# Patient Record
Sex: FEMALE | Race: WHITE | NOT HISPANIC OR LATINO | Employment: UNEMPLOYED | URBAN - NONMETROPOLITAN AREA
[De-identification: names, ages, dates, MRNs, and addresses within clinical notes are randomized per-mention and may not be internally consistent; named-entity substitution may affect disease eponyms.]

---
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
## 2024-02-20 ENCOUNTER — HOSPITAL ENCOUNTER (EMERGENCY)
Facility: HOSPITAL | Age: 19
Discharge: HOME | End: 2024-02-20
Attending: STUDENT IN AN ORGANIZED HEALTH CARE EDUCATION/TRAINING PROGRAM | Admitting: EMERGENCY MEDICINE
Payer: COMMERCIAL

## 2024-02-20 VITALS
WEIGHT: 123 LBS | RESPIRATION RATE: 16 BRPM | OXYGEN SATURATION: 99 % | DIASTOLIC BLOOD PRESSURE: 60 MMHG | HEART RATE: 80 BPM | TEMPERATURE: 98.4 F | HEIGHT: 64 IN | SYSTOLIC BLOOD PRESSURE: 128 MMHG | BODY MASS INDEX: 21 KG/M2

## 2024-02-20 DIAGNOSIS — S65.211A: Primary | ICD-10-CM

## 2024-02-20 PROCEDURE — 99283 EMERGENCY DEPT VISIT LOW MDM: CPT

## 2024-02-20 PROCEDURE — 90715 TDAP VACCINE 7 YRS/> IM: CPT | Performed by: NURSE PRACTITIONER

## 2024-02-20 PROCEDURE — 90471 IMMUNIZATION ADMIN: CPT | Performed by: NURSE PRACTITIONER

## 2024-02-20 PROCEDURE — 25010000002 TETANUS-DIPHTH-ACELL PERTUSSIS 5-2.5-18.5 LF-MCG/0.5 SUSPENSION PREFILLED SYRINGE: Performed by: NURSE PRACTITIONER

## 2024-02-20 RX ADMIN — TETANUS TOXOID, REDUCED DIPHTHERIA TOXOID AND ACELLULAR PERTUSSIS VACCINE, ADSORBED 0.5 ML: 5; 2.5; 8; 8; 2.5 SUSPENSION INTRAMUSCULAR at 21:23

## 2024-02-21 NOTE — ED PROVIDER NOTES
Subjective   History of Present Illness  Patient is a 18-year-old female with no significant past medical history presenting to the ER complaints of right palm laceration after cutting herself with a knife trying to make potato soup.  Patient denies any additional injuries.  Patient unsure if she is up-to-date on Tdap.  Bleeding is controlled.    History provided by:  Patient   used: No        Review of Systems   Constitutional: Negative.  Negative for fever.   HENT: Negative.     Respiratory: Negative.     Cardiovascular: Negative.  Negative for chest pain.   Gastrointestinal: Negative.  Negative for abdominal pain.   Endocrine: Negative.    Genitourinary: Negative.  Negative for dysuria.   Skin:  Positive for wound.   Neurological: Negative.    Psychiatric/Behavioral: Negative.     All other systems reviewed and are negative.      Past Medical History:   Diagnosis Date    Allergic rhinitis        Allergies   Allergen Reactions    Vancomycin        No past surgical history on file.    No family history on file.    Social History     Socioeconomic History    Marital status: Single   Tobacco Use    Smoking status: Never           Objective   Physical Exam  Vitals and nursing note reviewed.   Constitutional:       General: She is not in acute distress.     Appearance: She is well-developed. She is not diaphoretic.   HENT:      Head: Normocephalic and atraumatic.      Right Ear: External ear normal.      Left Ear: External ear normal.      Nose: Nose normal.   Eyes:      Conjunctiva/sclera: Conjunctivae normal.      Pupils: Pupils are equal, round, and reactive to light.   Neck:      Vascular: No JVD.      Trachea: No tracheal deviation.   Cardiovascular:      Rate and Rhythm: Normal rate and regular rhythm.      Heart sounds: Normal heart sounds. No murmur heard.  Pulmonary:      Effort: Pulmonary effort is normal. No respiratory distress.      Breath sounds: Normal breath sounds. No wheezing.    Abdominal:      General: Bowel sounds are normal.      Palpations: Abdomen is soft.      Tenderness: There is no abdominal tenderness.   Musculoskeletal:         General: No deformity. Normal range of motion.      Cervical back: Normal range of motion and neck supple.   Skin:     General: Skin is warm and dry.      Coloration: Skin is not pale.      Findings: Laceration and wound present. No erythema or rash.      Comments: 0.25 cm laceration to right palm of hand    Neurological:      Mental Status: She is alert and oriented to person, place, and time.      Cranial Nerves: No cranial nerve deficit.   Psychiatric:         Behavior: Behavior normal.         Thought Content: Thought content normal.         Laceration Repair    Date/Time: 2/20/2024 9:30 PM    Performed by: Tatiana Plaza APRN  Authorized by: Killian Townsend DO    Consent:     Consent obtained:  Verbal    Consent given by:  Patient    Risks, benefits, and alternatives were discussed: yes      Risks discussed:  Infection, need for additional repair, nerve damage, vascular damage, tendon damage, retained foreign body, pain, poor cosmetic result and poor wound healing    Alternatives discussed:  No treatment, delayed treatment, observation and referral  Universal protocol:     Procedure explained and questions answered to patient or proxy's satisfaction: yes      Relevant documents present and verified: yes      Test results available: yes      Imaging studies available: yes      Required blood products, implants, devices, and special equipment available: yes      Site/side marked: yes      Immediately prior to procedure, a time out was called: yes      Patient identity confirmed:  Verbally with patient and arm band  Anesthesia:     Anesthesia method:  None  Laceration details:     Location:  Hand    Hand location:  R palm    Length (cm):  0.3  Pre-procedure details:     Preparation:  Patient was prepped and draped in usual sterile  fashion  Exploration:     Limited defect created (wound extended): no      Hemostasis achieved with:  Direct pressure    Imaging outcome: foreign body not noted      Wound exploration: wound explored through full range of motion      Wound extent: no areolar tissue violation noted, no fascia violation noted, no foreign bodies/material noted, no muscle damage noted, no nerve damage noted, no tendon damage noted, no underlying fracture noted and no vascular damage noted      Contaminated: no    Treatment:     Area cleansed with:  Povidone-iodine    Amount of cleaning:  Standard    Debridement:  None    Undermining:  None    Scar revision: no    Skin repair:     Repair method:  Steri-Strips    Number of Steri-Strips:  1  Approximation:     Approximation:  Close  Repair type:     Repair type:  Simple  Post-procedure details:     Dressing:  Open (no dressing)    Procedure completion:  Tolerated well, no immediate complications             ED Course  ED Course as of 02/20/24 2131 Tue Feb 20, 2024 2117 Steri strip right palm for wound closure  [SM]      ED Course User Index  [SM] Tatiana Plaza APRN                                             Medical Decision Making  Patient is a 18-year-old female with no significant past medical history presenting to the ER complaints of right palm laceration after cutting herself with a knife trying to make potato soup.  Patient denies any additional injuries.  Patient unsure if she is up-to-date on Tdap.  Bleeding is controlled.    Advised patient to return to the ER with new or worsening symptoms.  Advised patient to follow-up with PCP.  Patient verbalized understanding and agrees.  Vital signs are stable at discharge.  Patient is in no acute distress.    Problems Addressed:  Laceration of superficial palmar arch of right hand, initial encounter: complicated acute illness or injury    Risk  Prescription drug management.        Final diagnoses:   Laceration of superficial  palmar arch of right hand, initial encounter       ED Disposition  ED Disposition       ED Disposition   Discharge    Condition   Stable    Comment   --               Yasmine, April, APRN  [address] [facility] [address] [phone]    Schedule an appointment as soon as possible for a visit   As needed         Medication List        Stop      cefdinir 300 MG capsule  Commonly known as: Tatiana Arrington, APRN  02/20/24 8309

## 2024-02-25 ENCOUNTER — HOSPITAL ENCOUNTER (EMERGENCY)
Facility: HOSPITAL | Age: 19
Discharge: HOME | End: 2024-02-25
Attending: EMERGENCY MEDICINE | Admitting: EMERGENCY MEDICINE
Payer: COMMERCIAL

## 2024-02-25 VITALS
BODY MASS INDEX: 21 KG/M2 | HEIGHT: 64 IN | WEIGHT: 123 LBS | OXYGEN SATURATION: 98 % | DIASTOLIC BLOOD PRESSURE: 58 MMHG | SYSTOLIC BLOOD PRESSURE: 115 MMHG | TEMPERATURE: 98.5 F | RESPIRATION RATE: 14 BRPM | HEART RATE: 90 BPM

## 2024-02-25 DIAGNOSIS — L23.9 ALLERGIC DERMATITIS: Primary | ICD-10-CM

## 2024-02-25 PROCEDURE — 99282 EMERGENCY DEPT VISIT SF MDM: CPT

## 2024-02-25 RX ORDER — METHYLPREDNISOLONE 4 MG/1
TABLET ORAL
Qty: 21 TABLET | Refills: 0 | Status: SHIPPED | OUTPATIENT
Start: 2024-02-25

## 2024-02-25 RX ORDER — CETIRIZINE HYDROCHLORIDE 10 MG/1
10 TABLET ORAL DAILY
Qty: 15 TABLET | Refills: 0 | Status: SHIPPED | OUTPATIENT
Start: 2024-02-25

## 2024-02-25 NOTE — ED PROVIDER NOTES
Subjective   History of Present Illness  C/o rash all over body that is pruritic, unsure what is causing her to itch.   Pt has allergies    No fever, sorethroat     History provided by:  Patient   used: No    Rash  Location:  Full body  Quality: itchiness and redness    Quality: not blistering, not bruising, not burning, not draining, not dry, not painful, not peeling, not scaling, not swelling and not weeping    Severity:  Mild  Onset quality:  Sudden  Duration:  2 days  Timing:  Constant  Chronicity:  New  Context comment:  Unsure  Relieved by:  Antihistamines  Worsened by:  Nothing  Ineffective treatments:  None tried      Review of Systems   Skin:  Positive for rash.       Past Medical History:   Diagnosis Date    Allergic rhinitis        Allergies   Allergen Reactions    Vancomycin        No past surgical history on file.    No family history on file.    Social History     Socioeconomic History    Marital status: Single   Tobacco Use    Smoking status: Never           Objective   Physical Exam  Vitals and nursing note reviewed.   Constitutional:       Appearance: She is well-developed.   HENT:      Head: Normocephalic.   Cardiovascular:      Rate and Rhythm: Normal rate and regular rhythm.   Pulmonary:      Effort: Pulmonary effort is normal.      Breath sounds: Normal breath sounds.   Abdominal:      General: Bowel sounds are normal.      Palpations: Abdomen is soft.      Tenderness: There is no abdominal tenderness.   Musculoskeletal:         General: Normal range of motion.      Cervical back: Neck supple.   Skin:     General: Skin is warm and dry.      Findings: Erythema and rash present. No petechiae.   Neurological:      Mental Status: She is alert and oriented to person, place, and time.   Psychiatric:         Behavior: Behavior normal.         Thought Content: Thought content normal.         Judgment: Judgment normal.         Procedures           ED Course                                              Medical Decision Making  C/o rash all over body that is pruritic, unsure what is causing her to itch.   Pt has allergies    No fever, sorethroat   Using benadryl with some improvement   No soa, throat swelling     Problems Addressed:  Allergic dermatitis: complicated acute illness or injury    Risk  OTC drugs.  Prescription drug management.  Risk Details: Jessica Sanders    Patient is stable.  Patient is medically cleared.  No EMC exist.  Patient is discharged home.  Patient's diagnostic results were discussed with him and they demonstrated understanding of diagnosis and treatment plan.  Patient was advised to return to the ER or follow-up with PCP if symptoms worsen or fail to improve as expected.         Final diagnoses:   Allergic dermatitis       ED Disposition  ED Disposition       ED Disposition   Discharge    Condition   Stable    Comment   --               Yasmine, April, APRN  [address]  [facility] [address] [phone]    In 2 days           Medication List        New Prescriptions      methylPREDNISolone 4 MG dose pack  Commonly known as: MEDROL  Take as directed on package instructions.               Where to Get Your Medications        These medications were sent to [id number] - [place], [place] - [address] [facility] AT [address] OF [address] [phone] [facility] [phone] FX  [address] [place] [address]      Phone: [phone]   cetirizine 10 MG tablet  methylPREDNISolone 4 MG dose pack            Jessica Sanders PA  02/25/24 8299       Jessica Sanders PA  02/25/24 6116

## 2024-03-05 ENCOUNTER — TRANSCRIBE ORDERS (OUTPATIENT)
Dept: ADMINISTRATIVE | Facility: HOSPITAL | Age: 19
End: 2024-03-05
Payer: COMMERCIAL

## 2024-03-05 DIAGNOSIS — R10.84 GENERALIZED ABDOMINAL PAIN: Primary | ICD-10-CM

## 2024-03-10 ENCOUNTER — HOSPITAL ENCOUNTER (OUTPATIENT)
Dept: ULTRASOUND IMAGING | Facility: HOSPITAL | Age: 19
Discharge: HOME | End: 2024-03-10
Admitting: FAMILY MEDICINE
Payer: COMMERCIAL

## 2024-03-10 DIAGNOSIS — R10.84 GENERALIZED ABDOMINAL PAIN: ICD-10-CM

## 2024-03-10 PROCEDURE — 76700 US EXAM ABDOM COMPLETE: CPT | Performed by: RADIOLOGY

## 2024-03-10 PROCEDURE — 76700 US EXAM ABDOM COMPLETE: CPT

## 2024-07-17 LAB
EXTERNAL ABO GROUPING: NORMAL
EXTERNAL ANTIBODY SCREEN: NEGATIVE
EXTERNAL HEPATITIS B SURFACE ANTIGEN: NEGATIVE
EXTERNAL HEPATITIS C AB: NORMAL
EXTERNAL RH FACTOR: POSITIVE
EXTERNAL RUBELLA QUALITATIVE: NORMAL
HIV1 P24 AG SERPL QL IA: NORMAL

## 2024-09-26 ENCOUNTER — REFERRAL TRIAGE (OUTPATIENT)
Dept: LABOR AND DELIVERY | Facility: HOSPITAL | Age: 19
End: 2024-09-26
Payer: COMMERCIAL

## 2024-10-17 ENCOUNTER — PATIENT OUTREACH (OUTPATIENT)
Dept: LABOR AND DELIVERY | Facility: HOSPITAL | Age: 19
End: 2024-10-17
Payer: COMMERCIAL

## 2024-10-17 ENCOUNTER — PATIENT MESSAGE (OUTPATIENT)
Dept: LABOR AND DELIVERY | Facility: HOSPITAL | Age: 19
End: 2024-10-17
Payer: COMMERCIAL

## 2024-10-17 NOTE — OUTREACH NOTE
[facility]  Intake    Current Estimated Gestational Age: 22w1d    Intake Assessment      Flowsheet Row Responses   Best Method for Contacting Home   Currently Employed No   Able to keep appointments as scheduled Yes   Do you have a dentist? No   Resources Presently Utilizing: HANDS, WIC (Women, Infant, Children)   Maternal Warning Signs Provided   Other: Provided   Other Education WIC Benefits, Mental Health Services, Insurance benefits/Incentives, How to find a primary care provider, How to find a pediatrician, Transportation Assistance, How to find a dentist, Substance Use Disorder Treatment, Housing Assistance, SNAP Benefits, HANDS, Smoking/Vaping Cessation            Learning Assessment      Flowsheet Row Responses   Relationship Patient   Does the learner have any barriers to learning? No Barriers   What is the preferred language of the learner for medical teaching? English   Is an  required? No   How does the learner prefer to learn new concepts? Listening, Reading            Spoke with patient over the phone. Discussed community and insurance extra benefits. Pt states she is enrolled in WIC and HANDS programs and is undecided on feeding plan for infant. SDOH and information packet for  sent by [facility]. Pt denies any urgent needs or concerns at this time.    Referral submitted to the following resources (verbal consent received to submit demographic information):         Tobacco, Alcohol, and Drug History     reports that she has never smoked. She does not have any smokeless tobacco history on file.   has no history on file for alcohol use.   has no history on file for drug use.    Rae Helton RN  Maternity Nurse Navigator    10/17/2024, 14:24 EDT        [facility]  Enrollment    Current Estimated Gestational Age: 22w1d    Questions/Answers      Flowsheet Row Responses   Would like to participate? Yes   Date of Intake Visit 10/17/24                Rae Helton RN  Maternity  Nurse Navigator    10/17/2024, 14:24 EDT

## 2024-11-07 ENCOUNTER — PATIENT OUTREACH (OUTPATIENT)
Dept: LABOR AND DELIVERY | Facility: HOSPITAL | Age: 19
End: 2024-11-07
Payer: COMMERCIAL

## 2024-11-07 NOTE — OUTREACH NOTE
[facility]  Check-In    Current Estimated Gestational Age: 25w1d      Questions/Answers      Flowsheet Row Responses   Best Method for Contacting Home   Demographics Reviewed Yes   Currently Employed No   Able to keep appointments as scheduled Yes   Gender(s) and Name(s) Female   Education related to new diagnoses/home equipment No   May I ask you questions about your substance use? Yes   Other Comment Denies   Supplies ready for baby Clothing   Resource/Environmental Concerns None   Do you have any questions related to your care experience, your pregnancy, plans for delivery, any concerns, etc? No            Spoke with patient over the phone. Pt states her pregnancy is going well. She denies any needs or concerns at this time.    Referral submitted to the following resources (verbal consent received to submit demographic information):         Rae Helton RN  Maternity Nurse Navigator    11/7/2024, 16:18 EST

## 2024-11-20 ENCOUNTER — HOSPITAL ENCOUNTER (EMERGENCY)
Facility: HOSPITAL | Age: 19
Discharge: HOME | End: 2024-11-20
Attending: EMERGENCY MEDICINE | Admitting: EMERGENCY MEDICINE
Payer: COMMERCIAL

## 2024-11-20 VITALS
BODY MASS INDEX: 22.36 KG/M2 | HEIGHT: 64 IN | DIASTOLIC BLOOD PRESSURE: 81 MMHG | SYSTOLIC BLOOD PRESSURE: 125 MMHG | TEMPERATURE: 97.8 F | RESPIRATION RATE: 20 BRPM | WEIGHT: 131 LBS | OXYGEN SATURATION: 97 % | HEART RATE: 104 BPM

## 2024-11-20 DIAGNOSIS — S05.01XA ABRASION OF RIGHT CORNEA, INITIAL ENCOUNTER: Primary | ICD-10-CM

## 2024-11-20 PROCEDURE — 99283 EMERGENCY DEPT VISIT LOW MDM: CPT

## 2024-11-20 RX ORDER — POLYMYXIN B SULFATE AND TRIMETHOPRIM 1; 10000 MG/ML; [USP'U]/ML
1 SOLUTION OPHTHALMIC
Status: DISCONTINUED | OUTPATIENT
Start: 2024-11-20 | End: 2024-11-20 | Stop reason: HOSPADM

## 2024-11-20 RX ORDER — POLYMYXIN B SULFATE AND TRIMETHOPRIM 1; 10000 MG/ML; [USP'U]/ML
1 SOLUTION OPHTHALMIC EVERY 4 HOURS
Qty: 10 ML | Refills: 0 | Status: SHIPPED | OUTPATIENT
Start: 2024-11-20

## 2024-11-20 RX ADMIN — POLYMYXIN B SULFATE AND TRIMETHOPRIM 1 DROP: 10000; 1 SOLUTION OPHTHALMIC at 19:14

## 2024-11-20 NOTE — ED PROVIDER NOTES
Subjective   History of Present Illness  19-year-old female with past medical history of allergic rhinitis presents to the emergency room with possible foreign body in right eye.  Patient states she was lying in bed yesterday and felt something in her eye.  She states since that time she has had trouble with light and holding her eyelid open as states it feels very brittany.  She is able to see without difficulty but at times is blurry.  Denies any other complaints or concerns at this time.    History provided by:  Patient   used: No        Review of Systems   Constitutional: Negative.  Negative for fever.   HENT: Negative.     Eyes:  Positive for redness.   Respiratory: Negative.     Cardiovascular: Negative.  Negative for chest pain.   Gastrointestinal: Negative.  Negative for abdominal pain.   Endocrine: Negative.    Genitourinary: Negative.  Negative for dysuria.   Skin: Negative.    Neurological: Negative.    Psychiatric/Behavioral: Negative.     All other systems reviewed and are negative.      Past Medical History:   Diagnosis Date    Allergic rhinitis        Allergies   Allergen Reactions    Vancomycin        No past surgical history on file.    No family history on file.    Social History     Socioeconomic History    Marital status: Single   Tobacco Use    Smoking status: Never           Objective   Physical Exam  Vitals and nursing note reviewed.   Constitutional:       General: She is not in acute distress.     Appearance: She is well-developed. She is not diaphoretic.   HENT:      Head: Normocephalic and atraumatic.      Right Ear: External ear normal.      Left Ear: External ear normal.      Nose: Nose normal.   Eyes:      Extraocular Movements: Extraocular movements intact.      Conjunctiva/sclera: Conjunctivae normal.      Right eye: Right conjunctiva is injected. No chemosis, exudate or hemorrhage.     Left eye: Left conjunctiva is not injected. No chemosis, exudate or hemorrhage.      Pupils: Pupils are equal, round, and reactive to light.   Neck:      Vascular: No JVD.      Trachea: No tracheal deviation.   Cardiovascular:      Rate and Rhythm: Normal rate and regular rhythm.      Heart sounds: Normal heart sounds. No murmur heard.  Pulmonary:      Effort: Pulmonary effort is normal. No respiratory distress.      Breath sounds: Normal breath sounds. No wheezing.   Abdominal:      General: Bowel sounds are normal.      Palpations: Abdomen is soft.      Tenderness: There is no abdominal tenderness.   Musculoskeletal:         General: No deformity. Normal range of motion.      Cervical back: Normal range of motion and neck supple.   Skin:     General: Skin is warm and dry.      Coloration: Skin is not pale.      Findings: No erythema or rash.   Neurological:      Mental Status: She is alert and oriented to person, place, and time.      Cranial Nerves: No cranial nerve deficit.   Psychiatric:         Behavior: Behavior normal.         Thought Content: Thought content normal.         Procedures           ED Course                                                       Medical Decision Making  19-year-old female with past medical history of allergic rhinitis presents to the emergency room with possible foreign body in right eye.  Patient states she was lying in bed yesterday and felt something in her eye.  She states since that time she has had trouble with light and holding her eyelid open as states it feels very brittany.  She is able to see without difficulty but at times is blurry.  Denies any other complaints or concerns at this time.    Patient's eye was irrigated thoroughly with no evidence of foreign body visualized.  I suspect patient may have a corneal abrasion given history, however I was unable to confirm this secondary to lack of fluorescein strips in the hospital.  I have been informed by pharmacy that the fluorescein strips have been discontinued and we are currently awaiting an  alternative method for such examinations.  This was discussed with attending, Dr. Townsend and has also been discussed with patient who was agreeable with treating for corneal abrasion and following up outpatient with ophthalmologist.    Problems Addressed:  Abrasion of right cornea, initial encounter: complicated acute illness or injury    Amount and/or Complexity of Data Reviewed  Discussion of management or test interpretation with external provider(s): Kristian Pedroza  Prescription drug management.        Final diagnoses:   Abrasion of right cornea, initial encounter       ED Disposition  ED Disposition       ED Disposition   Discharge    Condition   Stable    Comment   --               Kwesi Garcia MD  [address] [facility] [id number] [phone]    In 2 days           Medication List        New Prescriptions      trimethoprim-polymyxin b 90877-4.1 UNIT/ML-% ophthalmic solution  Commonly known as: Polytrim  Administer 1 drop to the right eye Every 4 (Four) Hours.               Where to Get Your Medications        These medications were sent to [place], [place] - [address] [place] [address] - [phone]  - [phone] [address] [address]      Phone: [phone]   trimethoprim-polymyxin b 38466-2.1 UNIT/ML-% ophthalmic solution            Merary Koch PA-C  11/20/24 1923

## 2024-12-02 ENCOUNTER — PATIENT OUTREACH (OUTPATIENT)
Dept: LABOR AND DELIVERY | Facility: HOSPITAL | Age: 19
End: 2024-12-02
Payer: COMMERCIAL

## 2024-12-02 NOTE — OUTREACH NOTE
[facility]  Check-In    Current Estimated Gestational Age: 28w5d      Questions/Answers      Flowsheet Row Responses   Best Method for Contacting Home   Demographics Reviewed Yes   Currently Employed No   Able to keep appointments as scheduled Yes   Gender(s) and Name(s) Female   Baby Active/Feeling Fetal Movemen Yes   Questions regarding prenatal visits or tests to be ordered? No   Education related to new diagnoses/home equipment No   May I ask you questions about your substance use? Yes   Other Comment Denies   Supplies ready for baby Clothing, Crib   Resource/Environmental Concerns None   Do you have any questions related to your care experience, your pregnancy, plans for delivery, any concerns, etc? No            Spoke with patient over the phone. Pt states her pregnancy is going well. She denies any needs or concerns today.    Referral submitted to the following resources (verbal consent received to submit demographic information):         Rae Helton RN  Maternity Nurse Navigator    12/2/2024, 16:34 EST

## 2024-12-07 ENCOUNTER — APPOINTMENT (OUTPATIENT)
Dept: CT IMAGING | Facility: HOSPITAL | Age: 19
End: 2024-12-07
Payer: COMMERCIAL

## 2024-12-07 ENCOUNTER — HOSPITAL ENCOUNTER (OUTPATIENT)
Facility: HOSPITAL | Age: 19
Setting detail: OBSERVATION
Discharge: ANOTHER HOSPITAL | End: 2024-12-07
Attending: EMERGENCY MEDICINE | Admitting: OBSTETRICS & GYNECOLOGY
Payer: COMMERCIAL

## 2024-12-07 VITALS
OXYGEN SATURATION: 100 % | BODY MASS INDEX: 22.2 KG/M2 | RESPIRATION RATE: 18 BRPM | WEIGHT: 130 LBS | HEART RATE: 124 BPM | HEIGHT: 64 IN | TEMPERATURE: 97.5 F | DIASTOLIC BLOOD PRESSURE: 73 MMHG | SYSTOLIC BLOOD PRESSURE: 110 MMHG

## 2024-12-07 DIAGNOSIS — R56.9 NEW ONSET SEIZURE: Primary | ICD-10-CM

## 2024-12-07 DIAGNOSIS — R82.71 ASYMPTOMATIC BACTERIURIA DURING PREGNANCY: ICD-10-CM

## 2024-12-07 DIAGNOSIS — O99.891 ASYMPTOMATIC BACTERIURIA DURING PREGNANCY: ICD-10-CM

## 2024-12-07 DIAGNOSIS — Z3A.29 29 WEEKS GESTATION OF PREGNANCY: ICD-10-CM

## 2024-12-07 LAB
ALBUMIN SERPL-MCNC: 3.8 G/DL (ref 3.5–5.2)
ALBUMIN/GLOB SERPL: 1.4 G/DL
ALP SERPL-CCNC: 179 U/L (ref 39–117)
ALT SERPL W P-5'-P-CCNC: 28 U/L (ref 1–33)
ANION GAP SERPL CALCULATED.3IONS-SCNC: 14.8 MMOL/L (ref 5–15)
AST SERPL-CCNC: 16 U/L (ref 1–32)
BACTERIA UR QL AUTO: ABNORMAL /HPF
BASOPHILS # BLD AUTO: 0.03 10*3/MM3 (ref 0–0.2)
BASOPHILS NFR BLD AUTO: 0.4 % (ref 0–1.5)
BILIRUB SERPL-MCNC: 0.3 MG/DL (ref 0–1.2)
BILIRUB UR QL STRIP: NEGATIVE
BUN SERPL-MCNC: 5 MG/DL (ref 6–20)
BUN/CREAT SERPL: 10.2 (ref 7–25)
CALCIUM SPEC-SCNC: 9.2 MG/DL (ref 8.6–10.5)
CHLORIDE SERPL-SCNC: 106 MMOL/L (ref 98–107)
CLARITY UR: CLEAR
CO2 SERPL-SCNC: 18.2 MMOL/L (ref 22–29)
COLOR UR: YELLOW
CREAT SERPL-MCNC: 0.49 MG/DL (ref 0.57–1)
CRP SERPL-MCNC: 0.69 MG/DL (ref 0–0.5)
DEPRECATED RDW RBC AUTO: 38.1 FL (ref 37–54)
EGFRCR SERPLBLD CKD-EPI 2021: 138.6 ML/MIN/1.73
EOSINOPHIL # BLD AUTO: 0.11 10*3/MM3 (ref 0–0.4)
EOSINOPHIL NFR BLD AUTO: 1.4 % (ref 0.3–6.2)
ERYTHROCYTE [DISTWIDTH] IN BLOOD BY AUTOMATED COUNT: 12.1 % (ref 12.3–15.4)
GEN 5 1HR TROPONIN T REFLEX: <6 NG/L
GLOBULIN UR ELPH-MCNC: 2.7 GM/DL
GLUCOSE SERPL-MCNC: 84 MG/DL (ref 65–99)
GLUCOSE UR STRIP-MCNC: NEGATIVE MG/DL
HCT VFR BLD AUTO: 32.4 % (ref 34–46.6)
HGB BLD-MCNC: 10.5 G/DL (ref 12–15.9)
HGB UR QL STRIP.AUTO: NEGATIVE
HYALINE CASTS UR QL AUTO: ABNORMAL /LPF
IMM GRANULOCYTES # BLD AUTO: 0.03 10*3/MM3 (ref 0–0.05)
IMM GRANULOCYTES NFR BLD AUTO: 0.4 % (ref 0–0.5)
KETONES UR QL STRIP: NEGATIVE
LEUKOCYTE ESTERASE UR QL STRIP.AUTO: ABNORMAL
LIPASE SERPL-CCNC: 40 U/L (ref 13–60)
LYMPHOCYTES # BLD AUTO: 2.39 10*3/MM3 (ref 0.7–3.1)
LYMPHOCYTES NFR BLD AUTO: 30 % (ref 19.6–45.3)
MAGNESIUM SERPL-MCNC: 1.6 MG/DL (ref 1.7–2.2)
MCH RBC QN AUTO: 28 PG (ref 26.6–33)
MCHC RBC AUTO-ENTMCNC: 32.4 G/DL (ref 31.5–35.7)
MCV RBC AUTO: 86.4 FL (ref 79–97)
MONOCYTES # BLD AUTO: 0.81 10*3/MM3 (ref 0.1–0.9)
MONOCYTES NFR BLD AUTO: 10.2 % (ref 5–12)
NEUTROPHILS NFR BLD AUTO: 4.59 10*3/MM3 (ref 1.7–7)
NEUTROPHILS NFR BLD AUTO: 57.6 % (ref 42.7–76)
NITRITE UR QL STRIP: NEGATIVE
NRBC BLD AUTO-RTO: 0 /100 WBC (ref 0–0.2)
PH UR STRIP.AUTO: 6.5 [PH] (ref 5–8)
PLATELET # BLD AUTO: 265 10*3/MM3 (ref 140–450)
PMV BLD AUTO: 8.3 FL (ref 6–12)
POTASSIUM SERPL-SCNC: 3.7 MMOL/L (ref 3.5–5.2)
PROT SERPL-MCNC: 6.5 G/DL (ref 6–8.5)
PROT UR QL STRIP: NEGATIVE
RBC # BLD AUTO: 3.75 10*6/MM3 (ref 3.77–5.28)
RBC # UR STRIP: ABNORMAL /HPF
REF LAB TEST METHOD: ABNORMAL
SODIUM SERPL-SCNC: 139 MMOL/L (ref 136–145)
SP GR UR STRIP: 1.02 (ref 1–1.03)
SQUAMOUS #/AREA URNS HPF: ABNORMAL /HPF
T4 FREE SERPL-MCNC: 1.08 NG/DL (ref 0.92–1.68)
TROPONIN T DELTA: NORMAL
TROPONIN T SERPL HS-MCNC: <6 NG/L
TSH SERPL DL<=0.05 MIU/L-ACNC: 6.29 UIU/ML (ref 0.27–4.2)
UROBILINOGEN UR QL STRIP: ABNORMAL
WBC # UR STRIP: ABNORMAL /HPF
WBC NRBC COR # BLD AUTO: 7.96 10*3/MM3 (ref 3.4–10.8)

## 2024-12-07 PROCEDURE — 84439 ASSAY OF FREE THYROXINE: CPT

## 2024-12-07 PROCEDURE — 25010000002 MAGNESIUM SULFATE 20 GM/500ML SOLUTION: Performed by: EMERGENCY MEDICINE

## 2024-12-07 PROCEDURE — 80050 GENERAL HEALTH PANEL: CPT | Performed by: EMERGENCY MEDICINE

## 2024-12-07 PROCEDURE — 70496 CT ANGIOGRAPHY HEAD: CPT | Performed by: RADIOLOGY

## 2024-12-07 PROCEDURE — 99285 EMERGENCY DEPT VISIT HI MDM: CPT

## 2024-12-07 PROCEDURE — 99204 OFFICE O/P NEW MOD 45 MIN: CPT | Performed by: INTERNAL MEDICINE

## 2024-12-07 PROCEDURE — 70498 CT ANGIOGRAPHY NECK: CPT

## 2024-12-07 PROCEDURE — 25510000001 IOPAMIDOL PER 1 ML: Performed by: EMERGENCY MEDICINE

## 2024-12-07 PROCEDURE — 94799 UNLISTED PULMONARY SVC/PX: CPT

## 2024-12-07 PROCEDURE — 83690 ASSAY OF LIPASE: CPT | Performed by: EMERGENCY MEDICINE

## 2024-12-07 PROCEDURE — 70496 CT ANGIOGRAPHY HEAD: CPT

## 2024-12-07 PROCEDURE — 25810000003 SODIUM CHLORIDE 0.9 % SOLUTION: Performed by: EMERGENCY MEDICINE

## 2024-12-07 PROCEDURE — 87086 URINE CULTURE/COLONY COUNT: CPT | Performed by: EMERGENCY MEDICINE

## 2024-12-07 PROCEDURE — 25810000003 LACTATED RINGERS PER 1000 ML: Performed by: OBSTETRICS & GYNECOLOGY

## 2024-12-07 PROCEDURE — 99214 OFFICE O/P EST MOD 30 MIN: CPT

## 2024-12-07 PROCEDURE — 84484 ASSAY OF TROPONIN QUANT: CPT | Performed by: EMERGENCY MEDICINE

## 2024-12-07 PROCEDURE — 81001 URINALYSIS AUTO W/SCOPE: CPT | Performed by: EMERGENCY MEDICINE

## 2024-12-07 PROCEDURE — 96365 THER/PROPH/DIAG IV INF INIT: CPT

## 2024-12-07 PROCEDURE — 70498 CT ANGIOGRAPHY NECK: CPT | Performed by: RADIOLOGY

## 2024-12-07 PROCEDURE — G0378 HOSPITAL OBSERVATION PER HR: HCPCS

## 2024-12-07 PROCEDURE — 83735 ASSAY OF MAGNESIUM: CPT | Performed by: EMERGENCY MEDICINE

## 2024-12-07 PROCEDURE — 36415 COLL VENOUS BLD VENIPUNCTURE: CPT | Performed by: EMERGENCY MEDICINE

## 2024-12-07 PROCEDURE — 70450 CT HEAD/BRAIN W/O DYE: CPT

## 2024-12-07 PROCEDURE — 96366 THER/PROPH/DIAG IV INF ADDON: CPT

## 2024-12-07 PROCEDURE — 86140 C-REACTIVE PROTEIN: CPT | Performed by: EMERGENCY MEDICINE

## 2024-12-07 RX ORDER — ALBUTEROL SULFATE 90 UG/1
2 INHALANT RESPIRATORY (INHALATION) EVERY 4 HOURS PRN
Status: DISCONTINUED | OUTPATIENT
Start: 2024-12-07 | End: 2024-12-07 | Stop reason: HOSPADM

## 2024-12-07 RX ORDER — SODIUM CHLORIDE 9 MG/ML
40 INJECTION, SOLUTION INTRAVENOUS AS NEEDED
Status: DISCONTINUED | OUTPATIENT
Start: 2024-12-07 | End: 2024-12-07 | Stop reason: HOSPADM

## 2024-12-07 RX ORDER — CEPHALEXIN 500 MG/1
500 CAPSULE ORAL ONCE
Status: DISCONTINUED | OUTPATIENT
Start: 2024-12-07 | End: 2024-12-07 | Stop reason: HOSPADM

## 2024-12-07 RX ORDER — ALBUTEROL SULFATE 90 UG/1
2 INHALANT RESPIRATORY (INHALATION) EVERY 4 HOURS PRN
COMMUNITY
End: 2024-12-07 | Stop reason: HOSPADM

## 2024-12-07 RX ORDER — SODIUM CHLORIDE 0.9 % (FLUSH) 0.9 %
10 SYRINGE (ML) INJECTION EVERY 12 HOURS SCHEDULED
Status: DISCONTINUED | OUTPATIENT
Start: 2024-12-07 | End: 2024-12-07 | Stop reason: HOSPADM

## 2024-12-07 RX ORDER — MAGNESIUM SULFATE HEPTAHYDRATE 40 MG/ML
2 INJECTION, SOLUTION INTRAVENOUS CONTINUOUS
Status: DISCONTINUED | OUTPATIENT
Start: 2024-12-07 | End: 2024-12-07 | Stop reason: HOSPADM

## 2024-12-07 RX ORDER — FERROUS SULFATE 325(65) MG
325 TABLET ORAL
COMMUNITY
End: 2024-12-07 | Stop reason: HOSPADM

## 2024-12-07 RX ORDER — IOPAMIDOL 755 MG/ML
100 INJECTION, SOLUTION INTRAVASCULAR
Status: COMPLETED | OUTPATIENT
Start: 2024-12-07 | End: 2024-12-07

## 2024-12-07 RX ORDER — SODIUM CHLORIDE 0.9 % (FLUSH) 0.9 %
10 SYRINGE (ML) INJECTION AS NEEDED
Status: DISCONTINUED | OUTPATIENT
Start: 2024-12-07 | End: 2024-12-07 | Stop reason: HOSPADM

## 2024-12-07 RX ORDER — CALCIUM GLUCONATE 94 MG/ML
1 INJECTION, SOLUTION INTRAVENOUS ONCE AS NEEDED
Status: DISCONTINUED | OUTPATIENT
Start: 2024-12-07 | End: 2024-12-07 | Stop reason: HOSPADM

## 2024-12-07 RX ORDER — SODIUM CHLORIDE, SODIUM LACTATE, POTASSIUM CHLORIDE, CALCIUM CHLORIDE 600; 310; 30; 20 MG/100ML; MG/100ML; MG/100ML; MG/100ML
75 INJECTION, SOLUTION INTRAVENOUS CONTINUOUS
Status: DISCONTINUED | OUTPATIENT
Start: 2024-12-07 | End: 2024-12-07 | Stop reason: HOSPADM

## 2024-12-07 RX ORDER — PRENATAL VIT NO.126/IRON/FOLIC 28MG-0.8MG
TABLET ORAL DAILY
COMMUNITY
End: 2024-12-07 | Stop reason: HOSPADM

## 2024-12-07 RX ORDER — ACETAMINOPHEN 325 MG/1
650 TABLET ORAL EVERY 4 HOURS PRN
Status: DISCONTINUED | OUTPATIENT
Start: 2024-12-07 | End: 2024-12-07 | Stop reason: HOSPADM

## 2024-12-07 RX ADMIN — MAGNESIUM SULFATE HEPTAHYDRATE 2 G/HR: 40 INJECTION, SOLUTION INTRAVENOUS at 18:11

## 2024-12-07 RX ADMIN — MAGNESIUM SULFATE HEPTAHYDRATE 2 G/HR: 40 INJECTION, SOLUTION INTRAVENOUS at 10:45

## 2024-12-07 RX ADMIN — SODIUM CHLORIDE 1000 ML: 9 INJECTION, SOLUTION INTRAVENOUS at 06:12

## 2024-12-07 RX ADMIN — IOPAMIDOL 70 ML: 755 INJECTION, SOLUTION INTRAVENOUS at 06:15

## 2024-12-07 RX ADMIN — MAGNESIUM SULFATE HEPTAHYDRATE 2 G/HR: 40 INJECTION, SOLUTION INTRAVENOUS at 06:12

## 2024-12-07 RX ADMIN — SODIUM CHLORIDE, POTASSIUM CHLORIDE, SODIUM LACTATE AND CALCIUM CHLORIDE 75 ML/HR: 600; 310; 30; 20 INJECTION, SOLUTION INTRAVENOUS at 11:04

## 2024-12-07 NOTE — NON STRESS TEST
Sera Jalloh, a  at 29w3d with an LISSETTE of 2025, by Last Menstrual Period, was seen at [facility]OR DEL[facility] for a nonstress test.    Chief Complaint   Patient presents with    Seizures    Numbness       Patient Active Problem List   Diagnosis    New onset seizure    New onset seizure without head trauma       Start Time: 912  Stop Time: 944    Interpretation A  Nonstress Test Interpretation A: Reactive  Comments A: NEGAR Dawn

## 2024-12-07 NOTE — DISCHARGE SUMMARY
Date of Discharge:  12/7/2024    Discharge Diagnosis: Intrauterine pregnancy at 29 weeks 3 days  New onset seizure versus pseudoseizure    Presenting Problem/History of Present Illness  Active Hospital Problems    Diagnosis  POA    **New onset seizure [R56.9]  Yes    New onset seizure without head trauma [R56.9]  Yes      Resolved Hospital Problems   No resolved problems to display.          Hospital Course  Patient is a 19 y.o. female presented with to the ED with new onset seizure activity.  Lab work and imaging done in the ED was within normal limits.  She was admitted to labor and delivery where blood work and blood pressure values did not show evidence of preeclampsia.  She was kept overnight for repeat labs and blood pressures as well as continuous fetal monitoring.  During the course of her visit fetal monitoring was always reassuring.  Blood pressures remained normal.  Lab work done this morning was within normal limits.  She began having spastic movements which were witnessed.  She was not postictal after these events and was responsive to commands during them.  The hospitalist service was consulted as well as neurology.  She began having more numerous events which were witnessed by both neurology and hospitalist.  Both doubt that this is epileptic activity.  An EEG was requested however it is not able to be performed here on the weekend Cerovel was placed.  After discussion with neurology the decision was made to transfer this patient to the [facility].  The patient was aware of the risks and benefits of transfer as well as the alternative of refusing transfer.  She and her  had no questions and consented to the transfer.  She is being sent to the [facility] under the care of Dr. PARK Pratt.    Procedures Performed  None       Consults:   Consults       Date and Time Order Name Status Description    12/7/2024  2:07 PM Inpatient Neurology Consult General Completed      12/7/2024 10:32 AM Inpatient Hospitalist Consult Completed             Pertinent Test Results:  CT of the head, CTA of head and neck.  All within normal limits, films reviewed personally    Condition on Discharge: Good    Vital Signs  Temp:  [95.5 °F (35.3 °C)-98.2 °F (36.8 °C)] 97.5 °F (36.4 °C)  Heart Rate:  [] 124  Resp:  [16-18] 18  BP: (107-137)/(64-85) 110/73    Physical Exam:     Please see recent exams done by neurology and the hospitalist service    Discharge Disposition  [facility] ([place] [facility])    Discharge Medications     Discharge Medications        Continue These Medications        Instructions Start Date   cetirizine 10 MG tablet  Commonly known as: zyrTEC   10 mg, Oral, Daily      methylPREDNISolone 4 MG dose pack  Commonly known as: MEDROL   Take as directed on package instructions.      trimethoprim-polymyxin b 06761-8.1 UNIT/ML-% ophthalmic solution  Commonly known as: Polytrim   1 drop, Right Eye, Every 4 Hours             Stop These Medications      albuterol sulfate  (90 Base) MCG/ACT inhaler  Commonly known as: PROVENTIL HFA;VENTOLIN HFA;PROAIR HFA     ferrous sulfate 325 (65 FE) MG tablet     prenatal (CLASSIC) vitamin 28-0.8 MG tablet tablet  Generic drug: prenatal vitamin              Discharge Diet: General    Activity at Discharge: Bedrest    Follow-up Appointments  No future appointments.  As per [facility]    Test Results Pending at Discharge  Pending Labs       Order Current Status    Urine Culture - Urine, Urine, Clean Catch In process             Tereza Arteaga DO  12/07/24  18:59 EST    Time:  1 hour including time spent with the patient, time spent conferring with multiple specialists, time spent arranging transfer.

## 2024-12-07 NOTE — CONSULTS
General Neurology Consult Note    Patient Name: Sera Jalloh   MRN: [id number]  Age: 19 y.o.  Sex: female  : 2005    Primary Care Physician: Hanane Underwood, APRLISA  Referring Physician:  No ref. provider found      Chief Complaint/Reason for Consultation: spells    Subjective .  HPI:   19 year old woman 29 weeks pregnant and hx of asthma coming after a seizure/spell. She had a spell just before I logged into the video camera and she had 2 spells that lasted for 30 seconds. No post ictal period. Spell consists of shaking all over. Eyes would roll back and variable shaking in all extremities. Neck would be arched back. She reports she can hear things around her during the spell but she can't respond.She can have right hand tingling before she has a seizure/spell. I was told she has had 7 spells in the last 30 minutes.    Risk factors: none of the below.  Hx of HT  Hx of encephalitis/meningitis  Hx of stroke  Hx of febrile seizures  FH of seizures    Review of Systems     10+ systems were reviewed.  In addition to what is listed in the HPI, the following was positive:     Constitutional: No fevers or chills.  Psychiatric: No depression/anxiety. Any stressful events  Skin: No rashes.  Respiratory: No shortness of breath.  Cardiovascular: No chest pain.  GI: No nausea/vomiting.  : No incontinence.  Endocrine: No polydipsia.  Musculoskeletal: No muscle pain/joint pain.  Neurologic: as per HPI and otherwise negative  Hematologic: No excessive bruising.        Temp:  [95.5 °F (35.3 °C)-98.2 °F (36.8 °C)] 95.5 °F (35.3 °C)  Heart Rate:  [] 99  Resp:  [16-18] 18  BP: (107-122)/(64-85) 119/70        Objective   Neurological Exam    General: Alert, cooperative, no distress, appears stated age  Head: normocephalic, without obvious abnormalities, atraumatic  Eyes: conjunctivae/corneas clear  Throat: lips, mucosa, and tongue normal  Lungs: non labored breathing  Extremities: No edema, no cyanosis, no  deformities  psych: judgement and insight is appropriate, see neuro exam for mental status.      Neurological Examination:    Mental status: fully alert; fully oriented; normal language fluency, comprehension. No dysarthria was appreciated; No evidence of visuospatial or tactile neglect;  Cranial nerves:  visual fields were full to confrontation; pupils were equal and reactive to light; versions were full without nystagmus; facial sensation was full; eye closure symmetric and smile was symmetric; handles own secretions, shoulder shrug was symmetric; tongue protruded midline.  Motor:  no pronator drift; power was grossly full throughout  Sensory:  pinprick and light touch full and symmetric;  Coordination:  no ataxia with finger to nose or heel to shin testing. No involuntary movements were observed.    Physical Exam          Past Medical History:   Diagnosis Date    Allergic rhinitis     Asthma      History reviewed. No pertinent surgical history.  History reviewed. No pertinent family history.  Social History     Socioeconomic History    Marital status: Single   Tobacco Use    Smoking status: Never   Substance and Sexual Activity    Alcohol use: Not Currently    Drug use: Not Currently    Sexual activity: Yes     Partners: Male     Allergies   Allergen Reactions    Vancomycin      Prior to Admission medications    Medication Sig Start Date End Date Taking? Authorizing Provider   albuterol sulfate  (90 Base) MCG/ACT inhaler Inhale 2 puffs Every 4 (Four) Hours As Needed for Wheezing.   Yes Haroldo Colindres MD   ferrous sulfate 325 (65 FE) MG tablet Take 1 tablet by mouth Daily With Breakfast.   Yes Haroldo Colindres MD   prenatal vitamin (prenatal, CLASSIC, vitamin) tablet Take  by mouth Daily.   Yes Haroldo Colindres MD   cetirizine (zyrTEC) 10 MG tablet Take 1 tablet by mouth Daily. 2/25/24   Jessica Sanders PA   methylPREDNISolone (MEDROL) 4 MG dose pack Take as directed on package  instructions. 2/25/24   Jessica Sanders PA   trimethoprim-polymyxin b (Polytrim) 18020-0.1 UNIT/ML-% ophthalmic solution Administer 1 drop to the right eye Every 4 (Four) Hours. 11/20/24   Merary Koch PA-C       [facility] Meds:  Scheduled- cephalexin, 500 mg, Oral, Once  sodium chloride, 10 mL, Intravenous, Q12H      Infusions- lactated ringers, 75 mL/hr, Last Rate: 75 mL/hr (12/07/24 1104)  magnesium sulfate, 2 g/hr, Last Rate: 2 g/hr (12/07/24 1045)       PRNs-   acetaminophen    albuterol sulfate HFA    calcium gluconate    sodium chloride    sodium chloride      Results Reviewed:  I have personally reviewed current lab, radiology, and data and agree with results.        Assessment/Plan:      Spells - likely non epileptic spells vs seizures  vEEG now.  Please call back once vEEG is done.    Mitch Coleman MD  December 7, 2024  17:08 EST      This was an audio and video enabled telemedicine encounter.

## 2024-12-07 NOTE — CONSULTS
[facility] Medicine Services  CONSULT NOTE     Inpatient Hospitalist Consult  Consult performed by: Carlos Underwood PA-C  Consult ordered by: Tereza Arteaga DO        Patient Identification:  Name:  Sera Jalloh  Age:  19 y.o.  Sex:  female  :  2005  MRN:  [id number]  Visit Number:  [id number]  Primary care provider:  Yasmine LISA    Length of stay:  0    Subjective     Chief Complaint:  Chief Complaint   Patient presents with    Seizures    Numbness       History of presenting illness:     Sera Jalloh is a 19 y.o. female admitted by OB/GYN secondary to possible new onset seizure activity.      Hospital medicine was consulted for assistance with medical management.  PMHx is significant for asthma  Vital signs overall stable-patient has been intermittently mildly tachycardic.  On laboratory workup thus far patient was found to have low magnesium at 1.6, alk phos is elevated at 179, CRP was 0.69.  No leukocytosis or left shift.  She is mildly anemic with normal MCV, hemoglobin is 10.5.  UA showed moderate leukocytes, 6-10 WBCs and 1+ bacteria though also noted with previous sick squamous epithelial cells.  Culture pending.  CT head was negative, CTA head was negative, CTA carotids was negative.    Patient was seen and examined in L&D w/ SO and RN at the bedside. She was sitting up in bed in no distress. She was pleasant and cooperative. She reports for the past two weeks she has had intermittent numbness/tingling and muscle spasms involving only her right upper and lower extremities without any reported weakness. Last night she had what was thought to be seizure activity during sleep around 4 AM which woke her spouse. He reports general, full body jerking/shaking- unclear regarding exact duration. He reports after episode ended the patient was very weak and her speech seemed garbled for a few minutes. Per review of ED documentation on arrival to the  ED patient had no focal deficits. She reports numbness/tingling are persisting without much improvement since onset this morning. She is also reporting dizziness/lightheadedness which seems to be worse with movement. She does report some dizziness at baseline which is also associated with position change but is not usually this severe. No blurred vision or headache reported. She states she has been eating and drinking okay.  She denies any prior history of seizures, has never had similar occurrences in the past. On further review of systems she denied any recent illness. No fevers, no cough/congestion, sore throat. No abdominal pain. No nausea, vomiting, or diarrhea since early in pregnancy. She denies any dysuria or difficulty with urination. She does complain of myalgias/arthralgias involving her low back and hips.   She denies any alcohol, tobacco, or drug use. She denies any significant PMHx. She takes no daily medications outside of her prenatal vitamin and an iron supplement.     ---------------------------------------------------------------------------------------------------------------------  Review of Systems   Constitutional:  Negative for chills and fever.   HENT:  Negative for congestion and rhinorrhea.    Eyes:  Negative for visual disturbance.   Respiratory:  Negative for cough and shortness of breath.    Cardiovascular:  Negative for chest pain and palpitations.   Gastrointestinal:  Negative for abdominal pain, diarrhea, nausea and vomiting.   Genitourinary:  Negative for difficulty urinating, dysuria and flank pain.   Musculoskeletal:  Positive for arthralgias and myalgias.   Neurological:  Positive for dizziness, seizures, light-headedness and numbness. Negative for facial asymmetry and speech difficulty.          ---------------------------------------------------------------------------------------------------------------------   Past History:  Past Medical History:   Diagnosis Date    Allergic  rhinitis     Asthma      History reviewed. No pertinent surgical history.  History reviewed. No pertinent family history.  Social History     Socioeconomic History    Marital status: Single   Tobacco Use    Smoking status: Never   Substance and Sexual Activity    Alcohol use: Not Currently    Drug use: Not Currently    Sexual activity: Yes     Partners: Male     ---------------------------------------------------------------------------------------------------------------------   Allergies:  Vancomycin  ---------------------------------------------------------------------------------------------------------------------   Prior to Admission Medications       Prescriptions Last Dose Informant Patient Reported? Taking?    albuterol sulfate  (90 Base) MCG/ACT inhaler 12/6/2024  Yes Yes    Inhale 2 puffs Every 4 (Four) Hours As Needed for Wheezing.    cetirizine (zyrTEC) 10 MG tablet   No No    Take 1 tablet by mouth Daily.    ferrous sulfate 325 (65 FE) MG tablet 12/6/2024  Yes Yes    Take 1 tablet by mouth Daily With Breakfast.    methylPREDNISolone (MEDROL) 4 MG dose pack   No No    Take as directed on package instructions.    prenatal vitamin (prenatal, CLASSIC, vitamin) tablet 12/6/2024  Yes Yes    Take  by mouth Daily.    trimethoprim-polymyxin b (Polytrim) 85027-7.1 UNIT/ML-% ophthalmic solution   No No    Administer 1 drop to the right eye Every 4 (Four) Hours.          ---------------------------------------------------------------------------------------------------------------------     Objective      Procedures:    [facility] Meds:  cephalexin, 500 mg, Oral, Once  sodium chloride, 10 mL, Intravenous, Q12H      lactated ringers, 75 mL/hr, Last Rate: 75 mL/hr (12/07/24 1104)  magnesium sulfate, 2 g/hr, Last Rate: 2 g/hr (12/07/24 1045)      ---------------------------------------------------------------------------------------------------------------------   Vital Signs:  Temp:  [95.5 °F (35.3 °C)-98.2 °F  (36.8 °C)] 95.5 °F (35.3 °C)  Heart Rate:  [] 98  Resp:  [16-18] 18  BP: (107-122)/(64-85) 111/65  Mean Arterial Pressure (Non-Invasive) for the past 24 hrs (Last 3 readings):   Noninvasive MAP (mmHg)   12/07/24 0555 83     SpO2 Percentage    12/07/24 1345 12/07/24 1350 12/07/24 1355   SpO2: 98% 98% 98%     SpO2:  [97 %-100 %] 98 %  on   ;   Device (Oxygen Therapy): room air    Body mass index is 22.31 kg/m².  Wt Readings from Last 3 Encounters:   12/07/24 59 kg (130 lb)   11/20/24 59.4 kg (131 lb)   02/25/24 55.8 kg (123 lb) (40%, Z= -0.26)*     * Growth percentiles are based on [facility] (Girls, 2-20 Years) data.        Intake/Output Summary (Last 24 hours) at 12/7/2024 1429  Last data filed at 12/7/2024 1300  Gross per 24 hour   Intake --   Output 450 ml   Net -450 ml     Diet: Regular/House; Fluid Consistency: Thin (IDDSI 0)  ---------------------------------------------------------------------------------------------------------------------   Physical exam:  Physical Exam  Vitals and nursing note reviewed.   Constitutional:       General: She is not in acute distress.  HENT:      Head: Normocephalic and atraumatic.   Eyes:      Extraocular Movements: Extraocular movements intact.   Cardiovascular:      Rate and Rhythm: Regular rhythm. Tachycardia present.   Pulmonary:      Effort: Pulmonary effort is normal.      Breath sounds: Normal breath sounds.   Musculoskeletal:      Right lower leg: No edema.      Left lower leg: No edema.   Skin:     General: Skin is warm and dry.   Neurological:      Mental Status: She is alert and oriented to person, place, and time.      Motor: Weakness (question slight  strength deficit- right hand) present.   Psychiatric:         Mood and Affect: Mood normal.         Behavior: Behavior normal.       ---------------------------------------------------------------------------------------------------------------------   EKG:  "      ---------------------------------------------------------------------------------------------------------------------   Results from last 7 days   Lab Units 12/07/24 0728 12/07/24 0524   HSTROP T ng/L <6 <6           Results from last 7 days   Lab Units 12/07/24 0524   CRP mg/dL 0.69*   WBC 10*3/mm3 7.96   HEMOGLOBIN g/dL 10.5*   HEMATOCRIT % 32.4*   MCV fL 86.4   MCHC g/dL 32.4   PLATELETS 10*3/mm3 265     Results from last 7 days   Lab Units 12/07/24 0524   SODIUM mmol/L 139   POTASSIUM mmol/L 3.7   MAGNESIUM mg/dL 1.6*   CHLORIDE mmol/L 106   CO2 mmol/L 18.2*   BUN mg/dL 5*   CREATININE mg/dL 0.49*   CALCIUM mg/dL 9.2   GLUCOSE mg/dL 84   ALBUMIN g/dL 3.8   BILIRUBIN mg/dL 0.3   ALK PHOS U/L 179*   AST (SGOT) U/L 16   ALT (SGPT) U/L 28   Estimated Creatinine Clearance: 170.6 mL/min (A) (by C-G formula based on SCr of 0.49 mg/dL (L)).  No results found for: \"AMMONIA\"    No results found for: \"HGBA1C\", \"POCGLU\"  No results found for: \"HGBA1C\"  No results found for: \"TSH\", \"FREET4\"    No results found for: \"BLOODCX\"  No results found for: \"URINECX\"  No results found for: \"WOUNDCX\"  No results found for: \"STOOLCX\"  No results found for: \"RESPCX\"  Pain Management Panel           No data to display              I have personally reviewed the above laboratory results.   ---------------------------------------------------------------------------------------------------------------------  Imaging Results (Last 7 Days)       Procedure Component Value Units Date/Time    CT Angiogram Head [[id number]] Collected: 12/07/24 0643     Updated: 12/07/24 0650    Narrative:      Comparison: None     Modality:  CTA Head and Neck W contrast     Technique:  CT angiography of the head and neck was obtained after uncomplicated IV  contrast administration. Axial, coronal, and sagittal reformats were  submitted.     Findings:     CTA Neck:     Classic anatomy of the aortic arch.  No osteal narrowing.     The bilateral common " carotid arteries are patent.     The bilateral internal and external carotid arteries are patent.     The vertebral arteries originate from the subclavian arteries.  The  vertebral arteries are patent.     CTA Head:     Patent distal internal carotid arteries.     Patent bilateral anterior and middle cerebral arteries.     Patent vertebrobasilar system.     Patent bilateral posterior cerebral arteries.     No aneurysm or vascular malformation.       Impression:      Impression:     No large vessel occlusion or significant vascular narrowing.     This report was finalized on 12/7/2024 6:47 AM by Berny Moses MD.       CT Angiogram Carotids [[id number]] Collected: 12/07/24 0643     Updated: 12/07/24 0650    Narrative:      Comparison: None     Modality:  CTA Head and Neck W contrast     Technique:  CT angiography of the head and neck was obtained after uncomplicated IV  contrast administration. Axial, coronal, and sagittal reformats were  submitted.     Findings:     CTA Neck:     Classic anatomy of the aortic arch.  No osteal narrowing.     The bilateral common carotid arteries are patent.     The bilateral internal and external carotid arteries are patent.     The vertebral arteries originate from the subclavian arteries.  The  vertebral arteries are patent.     CTA Head:     Patent distal internal carotid arteries.     Patent bilateral anterior and middle cerebral arteries.     Patent vertebrobasilar system.     Patent bilateral posterior cerebral arteries.     No aneurysm or vascular malformation.       Impression:      Impression:     No large vessel occlusion or significant vascular narrowing.     This report was finalized on 12/7/2024 6:47 AM by Berny Moses MD.       CT Head Without Contrast [[id number]] Collected: 12/07/24 0639     Updated: 12/07/24 0645    Narrative:      Comparison: None     Modality:     CT Head WO Contrast.     Technique: Axial CT head without contrast was obtained.     Findings:      The ventricles, basal cisterns, and cerebral sulci are unremarkable.  No acute intracranial hemorrhage or mass effect.  No acute infarction.  The paranasal sinuses are clear.  The calvarium and soft tissues are unremarkable.       Impression:      Impression:     No acute intracranial findings.        This report was finalized on 12/7/2024 6:43 AM by Berny Moses MD.             I have personally reviewed the above radiology results.   ----------------------------------------------------------------------------------------------------------------------    Assessment/Plan       Possible new onset seizure activity   RUE/LUE Paresthesias  Hypomagnesemia  IUP 29 weeks  Patient presented originally to the ED secondary to concerns for new onset seizure activity.  She is 29 weeks pregnant and was admitted to the labor and delivery unit.  Magnesium was low at 1.6 on arrival, workup thus far unrevealing, not felt to be consistent with eclampsia per attending.  Imaging workup unremarkable thus far. BP is normal, stable.  Patient noted with recurrent seizure-like activity once admitted with no postictal phase no loss of bowel or bladder control.  She is reporting associated paresthesias. Stated she has been having paresthesias and muscle cramping/spasms involving the RUE/LUE x 2 weeks  Continuing mag sulfate infusion per primary  LR at 75 mL/h  Repeat labs are pending including LDH-will follow-up. Add UDS, B12, folate, TSH/T4  Will consult neurology for further assistance and recommendations, appreciated.  Would recommend seizure precautions    Abnormal UA  Continue Keflex per primary  Follow-up culture result    Thank you for the consult and allowing us to participate in the care of your patient, hospital medicine will continue to follow. Please do not hesitate to call with any questions or concerns.      Carlos Underwood PA-C  12/07/24  14:29 EST    Attestation: I personally discussed the patient's history of  presenting illness, physical examination, assessment, and plan with my attending physician, Dr. Birmingham.

## 2024-12-07 NOTE — H&P
"Assessment & Plan     29 and 3/7 weeks gestation.  Not in labor.  Obstetrical history significant for new onset seizure.    Observation  NST q shift  Repeat labs at 7PM and in AM  Discharge home if she continues to remain non-diagnostic for PET   Collect 24 hour urine    Subjective     Sera Jalloh is a 19 y.o.  female with EDC 25 at 29 and 3/7 weeks gestation who is being admitted for observation.  Her current obstetrical history is significant for new onset seizure without diagnosis of PET.  Patient reports no bleeding, no cramping, and no leaking.   Fetal Movement: normal.  She came to the ED last night with new onset seizure.  Seizure was witnessed by her spouse who reports that she was \" jerking her arms and legs and rolling her eyes back.  When it resolved her speech was garbled.  She reports tingling in her left arm and leg afterward. When she was in the ED, the ED physician noted facial drooping.    The following portions of the patient's history were reviewed and updated as appropriate: allergies, current medications, past family history, past medical history, past social history, past surgical history, and problem list.     Objective     Vital signs in last 24 hours:  Temp:  [95.5 °F (35.3 °C)-98.2 °F (36.8 °C)] 95.5 °F (35.3 °C)  Heart Rate:  [] 106  Resp:  [16-18] 18  BP: (108-121)/(73-79) 119/77    General:   alert, appears stated age, and cooperative   Skin:   normal   HEENT:  neck supple with midline trachea and thyroid without masses   Lungs:   clear to auscultation bilaterally   Heart:   regular rate and rhythm, S1, S2 normal, no murmur, click, rub or gallop   Breasts:    deferred   Abdomen:  soft, non-tender; bowel sounds normal; no masses,  no organomegaly   Pelvis:  Exam deferred.   FHT:  139 BPM   Neuro DTR 1/4 upper, 2/4 lower    Muscle strength 4/4    No clonus    No pronator drift    Hand grasps right<left    Face symetrical    Speech clear    CN 2-12 are grossly intact "      Lab Review   O, Rh+, Rubella-immune, Hepatitis B surface antigen non-reactive     One hour GTT: Normal

## 2024-12-07 NOTE — PROGRESS NOTES
Called to bedside by nursing for repeat seizure activity.    Objective:  Patient is supine with her arms raised up words and her left third finger flexed.  Her jaw is loose and able to be easily opened.  After the seizure the patient was able to speak appropriately, she was not postictal, she had no loss of urine.  She reports that she knew that something was going to happen because she developed numbness and tingling in her right arm.    Assessment:  Intrauterine pregnancy at 29 weeks 3 days  New onset seizures  Doubt eclampsia given her normal blood work and blood pressures  Suspect this may be a pseudoseizure    Plan:  Hospitalist service consulted  Magnesium started for seizure prophy in the event that this is a very unusual case of eclampsia  Imaging done earlier in the emergency department was within normal limits  CMP includes creatinine of 0.49, AST of 16, ALT of 28. platelets are 265.  Urine dip does not show any protein.

## 2024-12-07 NOTE — NURSING NOTE
In last 50 min Patient has had around 11 episodes of seizure like activity that last around 10-20 sec Dr. Coleman on video call to speak with patient and did observe 3 of the activity

## 2024-12-07 NOTE — PROGRESS NOTES
Case d.w Dr. Richardson - Cerebelle shows 10% seizure activity but only 3% while she is having spasms.  In addition she is following commands during the spasms.  Case d/w Dr. Coleman - he does not think this is epileptic activity but wants a formal EEG.  Thinks that she needs to be transferred to  get this done  Case d/w Dr. RADHA Pratt and Dr. Torsten [facility]  They are accepting the patient for transfer.  She is going to [facility] - 5th Floor - room 133.  Phone number for RN station is [phone].  They are requesting a CD of her scans done in the ED - the House Supervisor will get these prior to transfer.  Beginning paperwork  MFM was present for the conversation and is aware of the normal labwork, normal CT scans, normal blood pressures and that she is on Magnesium.  Also aware of reassuring fetal status.    I let the family know that she is being transferred    Risks of transfer - deterioration of condition, delivery en route, loss of lines, air accident  Benefits - availability of specialized resources  Alternative - Remain here (not recommended)  Patient and spouse say that they have no questions.

## 2024-12-07 NOTE — ED PROVIDER NOTES
"Subjective   History of Present Illness  Sera is a 19-year-old G1, P0 who presents to the ED for chief complaint of new onset seizure facial drooping and right arm tingling.  Patient blood pressure was 119/79 upon arrival.  Patient is stating that she feels like she is \"on the laughing gas.\"  Patient is alert, has no focal neurodeficits, she is able to answer questions she has no slurred speech her last known well was 430.        Review of Systems   All other systems reviewed and are negative.      Past Medical History:   Diagnosis Date    Allergic rhinitis        Allergies   Allergen Reactions    Vancomycin        No past surgical history on file.    No family history on file.    Social History     Socioeconomic History    Marital status: Single   Tobacco Use    Smoking status: Never           Objective   Physical Exam  Vitals reviewed.   Constitutional:       Appearance: Normal appearance. She is normal weight.   HENT:      Head: Normocephalic and atraumatic.      Right Ear: External ear normal.      Left Ear: External ear normal.      Nose: Nose normal.      Mouth/Throat:      Mouth: Mucous membranes are moist. Mucous membranes are dry.      Pharynx: Oropharynx is clear.   Eyes:      Extraocular Movements: Extraocular movements intact.      Conjunctiva/sclera: Conjunctivae normal.      Pupils: Pupils are equal, round, and reactive to light.   Cardiovascular:      Rate and Rhythm: Normal rate and regular rhythm.      Pulses: Normal pulses.      Heart sounds: Normal heart sounds.   Pulmonary:      Effort: Pulmonary effort is normal.      Breath sounds: Normal breath sounds.   Abdominal:      General: Bowel sounds are normal.      Palpations: Abdomen is soft.   Musculoskeletal:         General: Normal range of motion.      Cervical back: Normal range of motion and neck supple. No rigidity.   Skin:     General: Skin is warm and dry.      Capillary Refill: Capillary refill takes less than 2 seconds. "   Neurological:      General: No focal deficit present.      Mental Status: She is alert and oriented to person, place, and time.   Psychiatric:         Mood and Affect: Mood normal.         Procedures           ED Course  ED Course as of 12/07/24 0701   Sat Dec 07, 2024   0530 Discussed the case with the OB/GYN, Dr. Arteaga she says to go ahead and give 6 g of mag however she is not advanced that this is all being driven seizure, but we will update her with labs and CT imaging. [AM]   0607 CT head preliminary reviewed by myself as no acute process [AM]   0618 CMP is unremarkable  Lipase is unremarkable  Troponin is less than 6   [AM]   0635 Nursing reports heart tones at 134 [AM]   0649 CT head was read as no acute process [AM]   0650 Patient's urine returns with moderate cassette esterase, 1+ bacteria, 6-10 WBCs 3-6 epithelial cells, no proteins no ketones no blood nitrites.  Will rediscussed the case with Dr. Arteaga per her request.  And get recommendations regarding disposition [AM]   0700 We discussed the case with Dr. Arteaga who agrees and would like the patient admitted to her for observation for further testing. [AM]      ED Course User Index  [AM] Judah Harrison MD                                                       Medical Decision Making  Sera is a 19-year-old G1, P0 who presents to the ED for chief complaint of new onset seizure facial drooping and right arm tingling.  Patient will have labs and imaging to evaluate for an acute CVA, I will also get in touch with OB/GYN to discuss mag or not and preemptively treating for eclampsia.    Amount and/or Complexity of Data Reviewed  Labs: ordered.  Radiology: ordered.    Risk  Prescription drug management.        Final diagnoses:   New onset seizure   29 weeks gestation of pregnancy   Asymptomatic bacteriuria during pregnancy       ED Disposition  ED Disposition       ED Disposition   Decision to Admit    Condition   --    Comment   Level of Care:  Labor & Delivery [17]   Diagnosis: New onset seizure [[id number]]                 No follow-up provider specified.       Medication List      No changes were made to your prescriptions during this visit.            Judah Harrison MD  12/07/24 0701

## 2024-12-09 LAB — BACTERIA SPEC AEROBE CULT: NO GROWTH

## 2024-12-31 ENCOUNTER — PATIENT MESSAGE (OUTPATIENT)
Dept: LABOR AND DELIVERY | Facility: HOSPITAL | Age: 19
End: 2024-12-31
Payer: COMMERCIAL

## 2024-12-31 ENCOUNTER — PATIENT OUTREACH (OUTPATIENT)
Dept: LABOR AND DELIVERY | Facility: HOSPITAL | Age: 19
End: 2024-12-31
Payer: COMMERCIAL

## 2024-12-31 NOTE — OUTREACH NOTE
[facility]  Unable to Reach    Questions/Answers      Flowsheet Row Responses   Pending Outreach Prenatal Check-in   Call Attempt First   Outcome Left message   Unable to reach comments: Spoke wtih patient's SO and left message.            Spoke with patients friend and left call back number. EPDS and [facility] pregnancy information sent by Ritu.    Rae Helton RN  Maternity Nurse Navigator    12/31/2024, 15:58 EST

## 2025-01-22 LAB — EXTERNAL GROUP B STREP ANTIGEN: NEGATIVE

## 2025-01-27 ENCOUNTER — PATIENT OUTREACH (OUTPATIENT)
Dept: LABOR AND DELIVERY | Facility: HOSPITAL | Age: 20
End: 2025-01-27
Payer: COMMERCIAL

## 2025-01-27 NOTE — OUTREACH NOTE
[facility]  Unable to Reach    Questions/Answers      Flowsheet Row Responses   Pending Outreach Prenatal Check-in   Call Attempt Second   Outcome Left message            Left message with Pts SO.     Rae Helton RN  Maternity Nurse Navigator    1/27/2025, 15:10 EST

## 2025-01-27 NOTE — OUTREACH NOTE
[facility]  Check-In    Current Estimated Gestational Age: 36w5d      Questions/Answers      Flowsheet Row Responses   Best Method for Contacting Home   Demographics Reviewed Yes   Currently Employed No   Able to keep appointments as scheduled Yes   Gender(s) and Name(s) Female   Baby Active/Feeling Fetal Movemen Yes   Questions regarding prenatal visits or tests to be ordered? No   Education related to new diagnoses/home equipment No   May I ask you questions about your substance use? Yes   Other Comment Denies   Supplies ready for baby Car Seat, Clothing, Crib, Diapers   Resource/Environmental Concerns None   Do you have any questions related to your care experience, your pregnancy, plans for delivery, any concerns, etc? No            Pt returned earlier call to her number. Pt reports her pregnancy is going well and that she plans to use [facility] for follow up care for her infant. EPDS completed. Pt denies any needs.    Referral submitted to the following resources (verbal consent received to submit demographic information):         Rae Helton RN  Maternity Nurse Navigator    1/27/2025, 15:24 EST

## 2025-02-02 ENCOUNTER — HOSPITAL ENCOUNTER (OUTPATIENT)
Facility: HOSPITAL | Age: 20
Discharge: HOME | End: 2025-02-02
Attending: OBSTETRICS & GYNECOLOGY | Admitting: OBSTETRICS & GYNECOLOGY
Payer: COMMERCIAL

## 2025-02-02 VITALS
DIASTOLIC BLOOD PRESSURE: 80 MMHG | WEIGHT: 142 LBS | RESPIRATION RATE: 20 BRPM | TEMPERATURE: 96.8 F | HEART RATE: 95 BPM | BODY MASS INDEX: 24.24 KG/M2 | SYSTOLIC BLOOD PRESSURE: 121 MMHG | HEIGHT: 64 IN

## 2025-02-02 PROBLEM — O26.899 ABDOMINAL CRAMPING AFFECTING PREGNANCY: Status: ACTIVE | Noted: 2025-02-02

## 2025-02-02 PROBLEM — R10.9 ABDOMINAL CRAMPING AFFECTING PREGNANCY: Status: ACTIVE | Noted: 2025-02-02

## 2025-02-02 PROBLEM — O26.643 INTRAHEPATIC CHOLESTASIS OF PREGNANCY IN THIRD TRIMESTER: Status: ACTIVE | Noted: 2025-02-02

## 2025-02-02 PROCEDURE — 59025 FETAL NON-STRESS TEST: CPT

## 2025-02-02 PROCEDURE — G0463 HOSPITAL OUTPT CLINIC VISIT: HCPCS

## 2025-02-02 NOTE — NON STRESS TEST
Sera Jalloh, a  at 37w4d with an LISSETTE of 2025, by Last Menstrual Period, was seen at [facility] LABOR DELIVERY for a nonstress test.    Chief Complaint   Patient presents with    Abdominal Cramping     PRESENTS WITH ABD CRAMPING AND DENIES ANY VAG BLEEDING AND NO LOF.  PT HAS +FM.       Patient Active Problem List   Diagnosis    New onset seizure    New onset seizure without head trauma    Abdominal cramping affecting pregnancy    Intrahepatic cholestasis of pregnancy in third trimester       Start Time: 1101  Stop Time: 1121    Interpretation A  Nonstress Test Interpretation A: Reactive  Comments A: VERIFIED BY PROSPER HUMMEL RN

## 2025-02-03 ENCOUNTER — PATIENT OUTREACH (OUTPATIENT)
Dept: LABOR AND DELIVERY | Facility: HOSPITAL | Age: 20
End: 2025-02-03
Payer: COMMERCIAL

## 2025-02-05 ENCOUNTER — HOSPITAL ENCOUNTER (INPATIENT)
Dept: LABOR AND DELIVERY | Facility: HOSPITAL | Age: 20
Discharge: HOME | End: 2025-02-05
Payer: COMMERCIAL

## 2025-02-05 ENCOUNTER — HOSPITAL ENCOUNTER (INPATIENT)
Facility: HOSPITAL | Age: 20
LOS: 1 days | Discharge: HOME | End: 2025-02-06
Attending: OBSTETRICS & GYNECOLOGY | Admitting: OBSTETRICS & GYNECOLOGY
Payer: COMMERCIAL

## 2025-02-05 ENCOUNTER — ANESTHESIA EVENT (OUTPATIENT)
Dept: LABOR AND DELIVERY | Facility: HOSPITAL | Age: 20
End: 2025-02-05
Payer: COMMERCIAL

## 2025-02-05 ENCOUNTER — ANESTHESIA (OUTPATIENT)
Dept: LABOR AND DELIVERY | Facility: HOSPITAL | Age: 20
End: 2025-02-05
Payer: COMMERCIAL

## 2025-02-05 PROBLEM — O26.643 CHOLESTASIS DURING PREGNANCY IN THIRD TRIMESTER: Status: ACTIVE | Noted: 2025-02-05

## 2025-02-05 LAB
ABO GROUP BLD: NORMAL
ABO GROUP BLD: NORMAL
ALBUMIN SERPL-MCNC: 3.1 G/DL (ref 3.5–5.2)
ALBUMIN/GLOB SERPL: 1 G/DL
ALP SERPL-CCNC: 291 U/L (ref 39–117)
ALT SERPL W P-5'-P-CCNC: 21 U/L (ref 1–33)
AMPHET+METHAMPHET UR QL: NEGATIVE
AMPHETAMINES UR QL: NEGATIVE
ANION GAP SERPL CALCULATED.3IONS-SCNC: 10 MMOL/L (ref 5–15)
AST SERPL-CCNC: 14 U/L (ref 1–32)
BARBITURATES UR QL SCN: NEGATIVE
BASOPHILS # BLD AUTO: 0.01 10*3/MM3 (ref 0–0.2)
BASOPHILS NFR BLD AUTO: 0.1 % (ref 0–1.5)
BENZODIAZ UR QL SCN: NEGATIVE
BILIRUB SERPL-MCNC: 0.3 MG/DL (ref 0–1.2)
BLD GP AB SCN SERPL QL: NEGATIVE
BUN SERPL-MCNC: 6 MG/DL (ref 6–20)
BUN/CREAT SERPL: 11.5 (ref 7–25)
BUPRENORPHINE SERPL-MCNC: NEGATIVE NG/ML
CALCIUM SPEC-SCNC: 9 MG/DL (ref 8.6–10.5)
CANNABINOIDS SERPL QL: NEGATIVE
CHLORIDE SERPL-SCNC: 106 MMOL/L (ref 98–107)
CO2 SERPL-SCNC: 17 MMOL/L (ref 22–29)
COCAINE UR QL: NEGATIVE
CREAT SERPL-MCNC: 0.52 MG/DL (ref 0.57–1)
DEPRECATED RDW RBC AUTO: 37.9 FL (ref 37–54)
EGFRCR SERPLBLD CKD-EPI 2021: 136.6 ML/MIN/1.73
EOSINOPHIL # BLD AUTO: 0.08 10*3/MM3 (ref 0–0.4)
EOSINOPHIL NFR BLD AUTO: 1.2 % (ref 0.3–6.2)
ERYTHROCYTE [DISTWIDTH] IN BLOOD BY AUTOMATED COUNT: 13.2 % (ref 12.3–15.4)
FENTANYL UR-MCNC: NEGATIVE NG/ML
GLOBULIN UR ELPH-MCNC: 3.1 GM/DL
GLUCOSE SERPL-MCNC: 81 MG/DL (ref 65–99)
HCT VFR BLD AUTO: 30.6 % (ref 34–46.6)
HGB BLD-MCNC: 9.7 G/DL (ref 12–15.9)
IMM GRANULOCYTES # BLD AUTO: 0.04 10*3/MM3 (ref 0–0.05)
IMM GRANULOCYTES NFR BLD AUTO: 0.6 % (ref 0–0.5)
LYMPHOCYTES # BLD AUTO: 1.73 10*3/MM3 (ref 0.7–3.1)
LYMPHOCYTES NFR BLD AUTO: 25.5 % (ref 19.6–45.3)
MCH RBC QN AUTO: 25.4 PG (ref 26.6–33)
MCHC RBC AUTO-ENTMCNC: 31.7 G/DL (ref 31.5–35.7)
MCV RBC AUTO: 80.1 FL (ref 79–97)
METHADONE UR QL SCN: NEGATIVE
MONOCYTES # BLD AUTO: 0.71 10*3/MM3 (ref 0.1–0.9)
MONOCYTES NFR BLD AUTO: 10.5 % (ref 5–12)
NEUTROPHILS NFR BLD AUTO: 4.21 10*3/MM3 (ref 1.7–7)
NEUTROPHILS NFR BLD AUTO: 62.1 % (ref 42.7–76)
NRBC BLD AUTO-RTO: 0 /100 WBC (ref 0–0.2)
OPIATES UR QL: NEGATIVE
OXYCODONE UR QL SCN: NEGATIVE
PCP UR QL SCN: NEGATIVE
PLATELET # BLD AUTO: 275 10*3/MM3 (ref 140–450)
PMV BLD AUTO: 9.5 FL (ref 6–12)
POTASSIUM SERPL-SCNC: 3.8 MMOL/L (ref 3.5–5.2)
PROT SERPL-MCNC: 6.2 G/DL (ref 6–8.5)
RBC # BLD AUTO: 3.82 10*6/MM3 (ref 3.77–5.28)
RH BLD: POSITIVE
RH BLD: POSITIVE
SODIUM SERPL-SCNC: 133 MMOL/L (ref 136–145)
T&S EXPIRATION DATE: NORMAL
TREPONEMA PALLIDUM IGG+IGM AB [PRESENCE] IN SERUM OR PLASMA BY IMMUNOASSAY: NORMAL
TRICYCLICS UR QL SCN: NEGATIVE
WBC NRBC COR # BLD AUTO: 6.78 10*3/MM3 (ref 3.4–10.8)

## 2025-02-05 PROCEDURE — 86901 BLOOD TYPING SEROLOGIC RH(D): CPT | Performed by: OBSTETRICS & GYNECOLOGY

## 2025-02-05 PROCEDURE — 86901 BLOOD TYPING SEROLOGIC RH(D): CPT

## 2025-02-05 PROCEDURE — 85025 COMPLETE CBC W/AUTO DIFF WBC: CPT | Performed by: OBSTETRICS & GYNECOLOGY

## 2025-02-05 PROCEDURE — 80053 COMPREHEN METABOLIC PANEL: CPT | Performed by: OBSTETRICS & GYNECOLOGY

## 2025-02-05 PROCEDURE — 86850 RBC ANTIBODY SCREEN: CPT | Performed by: OBSTETRICS & GYNECOLOGY

## 2025-02-05 PROCEDURE — 25010000002 BUPIVACAINE (PF) 0.25 % SOLUTION: Performed by: ANESTHESIOLOGY

## 2025-02-05 PROCEDURE — 86780 TREPONEMA PALLIDUM: CPT | Performed by: OBSTETRICS & GYNECOLOGY

## 2025-02-05 PROCEDURE — 0KQM0ZZ REPAIR PERINEUM MUSCLE, OPEN APPROACH: ICD-10-PCS | Performed by: OBSTETRICS & GYNECOLOGY

## 2025-02-05 PROCEDURE — 3E0DXGC INTRODUCTION OF OTHER THERAPEUTIC SUBSTANCE INTO MOUTH AND PHARYNX, EXTERNAL APPROACH: ICD-10-PCS | Performed by: OBSTETRICS & GYNECOLOGY

## 2025-02-05 PROCEDURE — 80307 DRUG TEST PRSMV CHEM ANLYZR: CPT | Performed by: OBSTETRICS & GYNECOLOGY

## 2025-02-05 PROCEDURE — 3E033VJ INTRODUCTION OF OTHER HORMONE INTO PERIPHERAL VEIN, PERCUTANEOUS APPROACH: ICD-10-PCS | Performed by: OBSTETRICS & GYNECOLOGY

## 2025-02-05 PROCEDURE — 59025 FETAL NON-STRESS TEST: CPT

## 2025-02-05 PROCEDURE — 25810000003 LACTATED RINGERS SOLUTION: Performed by: OBSTETRICS & GYNECOLOGY

## 2025-02-05 PROCEDURE — C1755 CATHETER, INTRASPINAL: HCPCS

## 2025-02-05 PROCEDURE — 25010000002 DIPHENHYDRAMINE PER 50 MG: Performed by: OBSTETRICS & GYNECOLOGY

## 2025-02-05 PROCEDURE — 86900 BLOOD TYPING SEROLOGIC ABO: CPT | Performed by: OBSTETRICS & GYNECOLOGY

## 2025-02-05 PROCEDURE — 86900 BLOOD TYPING SEROLOGIC ABO: CPT

## 2025-02-05 PROCEDURE — 25810000003 LACTATED RINGERS PER 1000 ML: Performed by: OBSTETRICS & GYNECOLOGY

## 2025-02-05 RX ORDER — CETIRIZINE HYDROCHLORIDE 10 MG/1
10 TABLET ORAL DAILY PRN
Status: ON HOLD | COMMUNITY
End: 2025-02-05

## 2025-02-05 RX ORDER — SODIUM CHLORIDE 0.9 % (FLUSH) 0.9 %
10 SYRINGE (ML) INJECTION AS NEEDED
Status: DISCONTINUED | OUTPATIENT
Start: 2025-02-05 | End: 2025-02-05

## 2025-02-05 RX ORDER — ACETAMINOPHEN 325 MG/1
650 TABLET ORAL EVERY 4 HOURS PRN
Status: DISCONTINUED | OUTPATIENT
Start: 2025-02-05 | End: 2025-02-05

## 2025-02-05 RX ORDER — OXYTOCIN/0.9 % SODIUM CHLORIDE 30/500 ML
999 PLASTIC BAG, INJECTION (ML) INTRAVENOUS ONCE
Status: DISCONTINUED | OUTPATIENT
Start: 2025-02-05 | End: 2025-02-05 | Stop reason: HOSPADM

## 2025-02-05 RX ORDER — ACETAMINOPHEN 325 MG/1
650 TABLET ORAL EVERY 6 HOURS PRN
Status: DISCONTINUED | OUTPATIENT
Start: 2025-02-05 | End: 2025-02-06

## 2025-02-05 RX ORDER — EPINEPHRINE 0.3 MG/.3ML
0.3 INJECTION SUBCUTANEOUS AS NEEDED
Status: ON HOLD | COMMUNITY
End: 2025-02-05

## 2025-02-05 RX ORDER — HYDROCODONE BITARTRATE AND ACETAMINOPHEN 5; 325 MG/1; MG/1
1 TABLET ORAL EVERY 4 HOURS PRN
Status: DISCONTINUED | OUTPATIENT
Start: 2025-02-05 | End: 2025-02-06

## 2025-02-05 RX ORDER — PRENATAL VIT/IRON FUM/FOLIC AC 27MG-0.8MG
1 TABLET ORAL DAILY
Status: ON HOLD | COMMUNITY
End: 2025-02-05

## 2025-02-05 RX ORDER — METOCLOPRAMIDE 10 MG/1
10 TABLET ORAL ONCE
Status: DISCONTINUED | OUTPATIENT
Start: 2025-02-05 | End: 2025-02-06

## 2025-02-05 RX ORDER — SODIUM CHLORIDE 0.9 % (FLUSH) 0.9 %
10 SYRINGE (ML) INJECTION EVERY 12 HOURS SCHEDULED
Status: DISCONTINUED | OUTPATIENT
Start: 2025-02-05 | End: 2025-02-05

## 2025-02-05 RX ORDER — DIPHENHYDRAMINE HYDROCHLORIDE 50 MG/ML
50 INJECTION INTRAMUSCULAR; INTRAVENOUS EVERY 6 HOURS PRN
Status: DISCONTINUED | OUTPATIENT
Start: 2025-02-05 | End: 2025-02-05

## 2025-02-05 RX ORDER — OXYTOCIN/0.9 % SODIUM CHLORIDE 30/500 ML
250 PLASTIC BAG, INJECTION (ML) INTRAVENOUS CONTINUOUS
Status: ACTIVE | OUTPATIENT
Start: 2025-02-05 | End: 2025-02-05

## 2025-02-05 RX ORDER — CETIRIZINE HYDROCHLORIDE 10 MG/1
10 TABLET ORAL DAILY PRN
Status: CANCELLED | OUTPATIENT
Start: 2025-02-05

## 2025-02-05 RX ORDER — IBUPROFEN 800 MG/1
800 TABLET, FILM COATED ORAL EVERY 6 HOURS PRN
Status: DISCONTINUED | OUTPATIENT
Start: 2025-02-05 | End: 2025-02-05

## 2025-02-05 RX ORDER — HYDROCORTISONE ACETATE PRAMOXINE HCL 1; 1 G/100G; G/100G
1 CREAM TOPICAL AS NEEDED
Status: DISCONTINUED | OUTPATIENT
Start: 2025-02-05 | End: 2025-02-07 | Stop reason: HOSPADM

## 2025-02-05 RX ORDER — URSODIOL 300 MG/1
300 CAPSULE ORAL 2 TIMES DAILY
Status: CANCELLED | OUTPATIENT
Start: 2025-02-05

## 2025-02-05 RX ORDER — DIPHENHYDRAMINE HCL 25 MG
25 CAPSULE ORAL NIGHTLY PRN
Status: DISCONTINUED | OUTPATIENT
Start: 2025-02-05 | End: 2025-02-05

## 2025-02-05 RX ORDER — ALBUTEROL SULFATE 90 UG/1
2 INHALANT RESPIRATORY (INHALATION) DAILY PRN
Status: CANCELLED | OUTPATIENT
Start: 2025-02-05

## 2025-02-05 RX ORDER — METHYLERGONOVINE MALEATE 0.2 MG/ML
200 INJECTION INTRAVENOUS ONCE AS NEEDED
Status: DISCONTINUED | OUTPATIENT
Start: 2025-02-05 | End: 2025-02-05

## 2025-02-05 RX ORDER — DIPHENHYDRAMINE HCL 25 MG
25 CAPSULE ORAL 2 TIMES DAILY PRN
Status: DISCONTINUED | OUTPATIENT
Start: 2025-02-05 | End: 2025-02-05

## 2025-02-05 RX ORDER — SODIUM CHLORIDE, SODIUM LACTATE, POTASSIUM CHLORIDE, CALCIUM CHLORIDE 600; 310; 30; 20 MG/100ML; MG/100ML; MG/100ML; MG/100ML
125 INJECTION, SOLUTION INTRAVENOUS CONTINUOUS
Status: DISCONTINUED | OUTPATIENT
Start: 2025-02-05 | End: 2025-02-05

## 2025-02-05 RX ORDER — BUTORPHANOL TARTRATE 2 MG/ML
2 INJECTION, SOLUTION INTRAMUSCULAR; INTRAVENOUS
Status: DISCONTINUED | OUTPATIENT
Start: 2025-02-05 | End: 2025-02-05

## 2025-02-05 RX ORDER — OXYTOCIN/0.9 % SODIUM CHLORIDE 30/500 ML
2-20 PLASTIC BAG, INJECTION (ML) INTRAVENOUS
Status: DISCONTINUED | OUTPATIENT
Start: 2025-02-05 | End: 2025-02-05

## 2025-02-05 RX ORDER — BUTORPHANOL TARTRATE 1 MG/ML
1 INJECTION, SOLUTION INTRAMUSCULAR; INTRAVENOUS
Status: DISCONTINUED | OUTPATIENT
Start: 2025-02-05 | End: 2025-02-05

## 2025-02-05 RX ORDER — ONDANSETRON 4 MG/1
4 TABLET, ORALLY DISINTEGRATING ORAL EVERY 8 HOURS PRN
Status: DISCONTINUED | OUTPATIENT
Start: 2025-02-05 | End: 2025-02-07 | Stop reason: HOSPADM

## 2025-02-05 RX ORDER — ONDANSETRON 2 MG/ML
4 INJECTION INTRAMUSCULAR; INTRAVENOUS EVERY 6 HOURS PRN
Status: DISCONTINUED | OUTPATIENT
Start: 2025-02-05 | End: 2025-02-05

## 2025-02-05 RX ORDER — DOCUSATE SODIUM 100 MG/1
100 CAPSULE, LIQUID FILLED ORAL 2 TIMES DAILY
Status: DISCONTINUED | OUTPATIENT
Start: 2025-02-05 | End: 2025-02-07 | Stop reason: HOSPADM

## 2025-02-05 RX ORDER — DIPHENHYDRAMINE HCL 25 MG
25 CAPSULE ORAL 2 TIMES DAILY PRN
Status: ON HOLD | COMMUNITY
End: 2025-02-06

## 2025-02-05 RX ORDER — FERROUS SULFATE 325(65) MG
325 TABLET ORAL DAILY
Status: CANCELLED | OUTPATIENT
Start: 2025-02-05

## 2025-02-05 RX ORDER — MISOPROSTOL 100 UG/1
600 TABLET ORAL AS NEEDED
Status: DISCONTINUED | OUTPATIENT
Start: 2025-02-05 | End: 2025-02-05

## 2025-02-05 RX ORDER — OXYTOCIN/0.9 % SODIUM CHLORIDE 30/500 ML
125 PLASTIC BAG, INJECTION (ML) INTRAVENOUS CONTINUOUS PRN
Status: DISCONTINUED | OUTPATIENT
Start: 2025-02-05 | End: 2025-02-07 | Stop reason: HOSPADM

## 2025-02-05 RX ORDER — DIPHENHYDRAMINE HCL 25 MG
25 CAPSULE ORAL 2 TIMES DAILY PRN
Status: CANCELLED | OUTPATIENT
Start: 2025-02-05

## 2025-02-05 RX ORDER — BISACODYL 10 MG
10 SUPPOSITORY, RECTAL RECTAL DAILY PRN
Status: DISCONTINUED | OUTPATIENT
Start: 2025-02-06 | End: 2025-02-07 | Stop reason: HOSPADM

## 2025-02-05 RX ORDER — ONDANSETRON 4 MG/1
4 TABLET, ORALLY DISINTEGRATING ORAL EVERY 6 HOURS PRN
Status: DISCONTINUED | OUTPATIENT
Start: 2025-02-05 | End: 2025-02-05

## 2025-02-05 RX ORDER — DIPHENHYDRAMINE HCL 25 MG
25 CAPSULE ORAL EVERY 6 HOURS PRN
Status: DISCONTINUED | OUTPATIENT
Start: 2025-02-05 | End: 2025-02-06

## 2025-02-05 RX ORDER — SODIUM CHLORIDE 0.9 % (FLUSH) 0.9 %
1-10 SYRINGE (ML) INJECTION AS NEEDED
Status: DISCONTINUED | OUTPATIENT
Start: 2025-02-05 | End: 2025-02-07 | Stop reason: HOSPADM

## 2025-02-05 RX ORDER — MINERAL OIL
OIL (ML) MISCELLANEOUS
Status: DISCONTINUED | OUTPATIENT
Start: 2025-02-05 | End: 2025-02-05

## 2025-02-05 RX ORDER — CARBOPROST TROMETHAMINE 250 UG/ML
250 INJECTION, SOLUTION INTRAMUSCULAR AS NEEDED
Status: DISCONTINUED | OUTPATIENT
Start: 2025-02-05 | End: 2025-02-05

## 2025-02-05 RX ORDER — HYDROCODONE BITARTRATE AND ACETAMINOPHEN 10; 325 MG/1; MG/1
1 TABLET ORAL EVERY 4 HOURS PRN
Status: DISCONTINUED | OUTPATIENT
Start: 2025-02-05 | End: 2025-02-06

## 2025-02-05 RX ORDER — BUPIVACAINE HYDROCHLORIDE 2.5 MG/ML
INJECTION, SOLUTION EPIDURAL; INFILTRATION; INTRACAUDAL AS NEEDED
Status: SHIPPED | OUTPATIENT
Start: 2025-02-05

## 2025-02-05 RX ORDER — URSODIOL 300 MG/1
300 CAPSULE ORAL 2 TIMES DAILY
Status: ON HOLD | COMMUNITY
End: 2025-02-05

## 2025-02-05 RX ORDER — FERROUS SULFATE 325(65) MG
325 TABLET ORAL DAILY
Status: ON HOLD | COMMUNITY
End: 2025-02-05

## 2025-02-05 RX ORDER — IBUPROFEN 800 MG/1
800 TABLET, FILM COATED ORAL 3 TIMES DAILY
Status: DISCONTINUED | OUTPATIENT
Start: 2025-02-05 | End: 2025-02-06

## 2025-02-05 RX ORDER — HYDROCORTISONE 25 MG/G
1 CREAM TOPICAL AS NEEDED
Status: DISCONTINUED | OUTPATIENT
Start: 2025-02-05 | End: 2025-02-07 | Stop reason: HOSPADM

## 2025-02-05 RX ORDER — PRENATAL VIT/IRON FUM/FOLIC AC 27MG-0.8MG
1 TABLET ORAL DAILY
Status: CANCELLED | OUTPATIENT
Start: 2025-02-05

## 2025-02-05 RX ORDER — DIPHENHYDRAMINE HYDROCHLORIDE 50 MG/ML
25 INJECTION INTRAMUSCULAR; INTRAVENOUS EVERY 6 HOURS PRN
Status: DISCONTINUED | OUTPATIENT
Start: 2025-02-05 | End: 2025-02-06

## 2025-02-05 RX ORDER — ALBUTEROL SULFATE 90 UG/1
2 INHALANT RESPIRATORY (INHALATION) DAILY PRN
Status: ON HOLD | COMMUNITY
End: 2025-02-05

## 2025-02-05 RX ADMIN — DOCUSATE SODIUM 100 MG: 100 CAPSULE, LIQUID FILLED ORAL at 22:12

## 2025-02-05 RX ADMIN — SODIUM CHLORIDE, POTASSIUM CHLORIDE, SODIUM LACTATE AND CALCIUM CHLORIDE 125 ML/HR: 600; 310; 30; 20 INJECTION, SOLUTION INTRAVENOUS at 06:46

## 2025-02-05 RX ADMIN — IBUPROFEN 800 MG: 800 TABLET, FILM COATED ORAL at 22:12

## 2025-02-05 RX ADMIN — Medication 2 MILLI-UNITS/MIN: at 07:35

## 2025-02-05 RX ADMIN — DIPHENHYDRAMINE HYDROCHLORIDE 50 MG: 50 INJECTION, SOLUTION INTRAMUSCULAR; INTRAVENOUS at 17:29

## 2025-02-05 RX ADMIN — BUPIVACAINE HYDROCHLORIDE 10 ML: 2.5 INJECTION, SOLUTION EPIDURAL; INFILTRATION; INTRACAUDAL; PERINEURAL at 16:46

## 2025-02-05 RX ADMIN — SODIUM CHLORIDE, POTASSIUM CHLORIDE, SODIUM LACTATE AND CALCIUM CHLORIDE 125 ML/HR: 600; 310; 30; 20 INJECTION, SOLUTION INTRAVENOUS at 13:46

## 2025-02-05 RX ADMIN — SODIUM CHLORIDE, POTASSIUM CHLORIDE, SODIUM LACTATE AND CALCIUM CHLORIDE 1000 ML: 600; 310; 30; 20 INJECTION, SOLUTION INTRAVENOUS at 16:15

## 2025-02-05 NOTE — ANESTHESIA PROCEDURE NOTES
Labor Epidural    Pre-sedation assessment completed: 2/5/2025 4:40 PM    Patient reassessed immediately prior to procedure    Patient location during procedure: OB  Start Time: 2/5/2025 4:40 PM  Stop Time: 2/5/2025 4:50 PM  Indication:at surgeon's request  Performed By  Anesthesiologist: Lake Miller MD  Preanesthetic Checklist  Completed: patient identified, IV checked, site marked, risks and benefits discussed, surgical consent, monitors and equipment checked, pre-op evaluation and timeout performed  Prep:  Pt Position:sitting  Sterile Tech:gloves, mask, sterile barrier and cap  Prep:povidone-iodine 7.5% surgical scrub  Monitoring:blood pressure monitoring  Epidural Block Procedure:  Approach:midline  Guidance:landmark technique and palpation technique  Location:L3-L4  Needle Type:Tuohy  Needle Gauge:17 G  Loss of Resistance Medium: air  Loss of Resistance: 6cm  Cath Depth at skin:10 cm  Paresthesia: none  Aspiration:negative  Test Dose:negative  Number of Attempts: 1  Post Assessment:  Dressing:occlusive dressing applied and secured with tape  Pt Tolerance:patient tolerated the procedure well with no apparent complications  Complications:no

## 2025-02-05 NOTE — H&P
[facility]  Obstetric History and Physical    Chief Complaint   Patient presents with    Scheduled Induction     CHOLESTASIS       Subjective     Patient is a 19 y.o. female  currently at 38w0d, who presents with IOL secondary to cholestasis.    Her prenatal care is complicated by cholestasis and h/o asthma.  Her previous obstetric/gynecological history is noted for is non-contributory.    The following portions of the patients history were reviewed and updated as appropriate: current medications, allergies, past medical history, past surgical history, past family history, past social history, and problem list .       Prenatal Information:   Maternal Prenatal Labs  Blood Type ABO Type   Date Value Ref Range Status   2025 O  Final      Rh Status RH type   Date Value Ref Range Status   2025 Positive  Final      Antibody Screen Antibody Screen   Date Value Ref Range Status   2025 Negative  Final      Rapid Urine Drug Screen Barbiturates Screen, Urine   Date Value Ref Range Status   2025 Negative Negative Final     Benzodiazepine Screen, Urine   Date Value Ref Range Status   2025 Negative Negative Final     Methadone Screen, Urine   Date Value Ref Range Status   2025 Negative Negative Final     Opiate Screen   Date Value Ref Range Status   2025 Negative Negative Final     THC, Screen, Urine   Date Value Ref Range Status   2025 Negative Negative Final     Cocaine Screen, Urine   Date Value Ref Range Status   2025 Negative Negative Final     Amphetamine Screen, Urine   Date Value Ref Range Status   2025 Negative Negative Final     Buprenorphine, Screen, Urine   Date Value Ref Range Status   2025 Negative Negative Final     Methamphetamine, Ur   Date Value Ref Range Status   2025 Negative Negative Final     Oxycodone Screen, Urine   Date Value Ref Range Status   2025 Negative Negative Final     Tricyclic Antidepressants Screen   Date Value Ref  "Range Status   02/05/2025 Negative Negative Final      Group B Strep Culture No results found for: \"GBSANTIGEN\"           External Prenatal Results       Pregnancy Outside Results - Transcribed From Office Records - See Scanned Records For Details       Test Value Date Time    ABO  O  02/05/25 0749    Rh  Positive  02/05/25 0749    Antibody Screen  Negative  02/05/25 0650      ^ Negative  07/17/24     Varicella IgG       Rubella ^ Immune  07/17/24     Hgb  9.7 g/dL 02/05/25 0650      ^ 10.4 g/dL 12/07/24 2205       10.5 g/dL 12/07/24 0524    Hct  30.6 % 02/05/25 0650      ^ 30.7 % 12/07/24 2205       32.4 % 12/07/24 0524    HgB A1c        1h GTT       3h GTT Fasting       3h GTT 1 hour       3h GTT 2 hour       3h GTT 3 hour        Gonorrhea (discrete) ^ Negative  01/22/25 1416    Chlamydia (discrete) ^ Negative  01/22/25 1416    RPR ^ Non Reactive  11/27/24 0933    Syphils cascade: TP-Ab (FTA)       TP-Ab       TP-Ab (EIA)       TPPA       HBsAg ^ Negative  07/17/24     Herpes Simplex Virus PCR       Herpes Simplex VIrus Culture       HIV ^ Non-Reactive  07/17/24     Hep C RNA Quant PCR       Hep C Antibody ^ NEG  07/17/24     AFP       NIPT       Cystic Fibrosis (Jimmy)       Cystic Fibroisis        Spinal Muscular atrophy       Fragile X       Group B Strep ^ Negative  01/22/25     GBS Susceptibility to Clindamycin       GBS Susceptibility to Erythromycin       Fetal Fibronectin       Genetic Testing, Maternal Blood                 Drug Screening       Test Value Date Time    Urine Drug Screen       Amphetamine Screen  Negative  02/05/25 0649    Barbiturate Screen  Negative  02/05/25 0649      ^ Negative  12/08/24 1051    Benzodiazepine Screen  Negative  02/05/25 0649      ^ Negative  12/08/24 1051    Methadone Screen  Negative  02/05/25 0649      ^ Negative  12/08/24 1051    Phencyclidine Screen  Negative  02/05/25 0649    Opiates Screen  Negative  02/05/25 0649      ^ Negative  12/08/24 1051    THC Screen  " Negative  25 0649    Cocaine Screen ^ Negative  24 1051    Propoxyphene Screen       Buprenorphine Screen  Negative  25 0649    Methamphetamine Screen       Oxycodone Screen  Negative  25 0649      ^ Negative  24 1051    Tricyclic Antidepressants Screen  Negative  25 0649              Legend    ^: Historical                              Past OB History:     OB History    Para Term  AB Living   1 0 0 0 0 0   SAB IAB Ectopic Molar Multiple Live Births   0 0 0 0 0 0      # Outcome Date GA Lbr Brett/2nd Weight Sex Type Anes PTL Lv   1 Current                Past Medical History: Past Medical History:   Diagnosis Date    Allergic rhinitis     Asthma     Cholestasis during pregnancy in third trimester     Seizures 2024    PSUDO ? ANXIETY      Past Surgical History Past Surgical History:   Procedure Laterality Date    DEBRIDEMENT OF ISCHIAL ULCER/BUTTOCKS WOUND      ABSESS    EXPLORATORY LAPAROSCOPY        Family History: Family History   Problem Relation Age of Onset    Lupus Mother     Lupus Maternal Grandmother       Social History:  reports that she has never smoked. She has never used smokeless tobacco.   reports that she does not currently use alcohol.   reports that she does not currently use drugs.        Review of Systems                  Review of Systems   Pertinent items are noted in HPI, all other systems reviewed and negative      Objective     Vital Signs Range for the last 24 hours  Temperature: Temp:  [97.9 °F (36.6 °C)-98 °F (36.7 °C)] 97.9 °F (36.6 °C)   Temp Source: Temp src: Oral   BP: BP: (117-126)/(77-85) 126/85   Pulse: Heart Rate:  [75-82] 78   Respirations: Resp:  [16] 16   Weight: Weight:  [64.4 kg (142 lb)] 64.4 kg (142 lb)     Physical Examination: General appearance - alert, well appearing, and in no distress  Chest - clear to auscultation, no wheezes, rales or rhonchi, symmetric air entry  Heart - normal rate, regular rhythm, normal S1,  S2, no murmurs, rubs, clicks or gallops  Abdomen - soft, nontender, nondistended, no masses or organomegaly  Extremities - peripheral pulses normal, no pedal edema, no clubbing or cyanosis    Presentation: cephalic   Cervix: Exam by:     Dilation: 1 cm   Effacement: Cervical Effacement: 60   Station: -2         Assessment & Plan       Cholestasis during pregnancy in third trimester      Assessment & Plan    Assessment/Plan:  1.  Intrauterine pregnancy at 38w0d weeks gestation with reassuring fetal status.    2.  Cholestasis will check CMP  3. GBS negative.   4. IOL- cytotec last pm and pitocin this am. Pt has no contraindication to nitrous oxide if desired.         Claire Elliott DO  2/5/2025  08:54 EST

## 2025-02-05 NOTE — NON STRESS TEST
Sera Jalloh, a  at 38w0d with an LISSETTE of 2025, by Last Menstrual Period, was seen at [facility] LABOR DELIVERY for a nonstress test.    Chief Complaint   Patient presents with    Scheduled Induction     CHOLESTASIS       Patient Active Problem List   Diagnosis    New onset seizure    New onset seizure without head trauma    Abdominal cramping affecting pregnancy    Intrahepatic cholestasis of pregnancy in third trimester    Cholestasis during pregnancy in third trimester       Start Time: 06  Stop Time: 0645    Interpretation A  Nonstress Test Interpretation A: Reactive  Comments A: VERIFIED ANTONIO PAGE RN

## 2025-02-05 NOTE — ANESTHESIA PREPROCEDURE EVALUATION
Anesthesia Evaluation     no history of anesthetic complications:   NPO Solid Status: > 8 hours  NPO Liquid Status: > 8 hours           Airway   Mallampati: I  TM distance: >3 FB  Neck ROM: full  No difficulty expected  Dental - normal exam     Pulmonary - normal exam   (+) asthma,  Cardiovascular - normal exam        Neuro/Psych  (+) seizures poorly controlled  GI/Hepatic/Renal/Endo    (+) liver disease    Musculoskeletal     Abdominal  - normal exam    Bowel sounds: normal.   Substance History      OB/GYN    (+) Pregnant        Other        ROS/Med Hx Other: Seizure vs abnormal spell or behavior              Anesthesia Plan    ASA 2     epidural     intravenous induction     Anesthetic plan, risks, benefits, and alternatives have been provided, discussed and informed consent has been obtained with: patient.    CODE STATUS:    Level Of Support Discussed With: Patient  Code Status (Patient has no pulse and is not breathing): CPR (Attempt to Resuscitate)  Medical Interventions (Patient has pulse or is breathing): Full Support

## 2025-02-05 NOTE — L&D DELIVERY NOTE
[facility]  Vaginal Delivery Note    Pre-op Diagnosis:  Intrauterine pregnancy at 38w0d    Delivery     Delivery: Vaginal, Spontaneous    YOB: 2026   Time of Birth: 5:52 PM     Anesthesia: Epidural    Delivering clinician: Dr Elliott   Forceps?   No   Vacuum? No    Shoulder dystocia present: No        Delivery narrative:              Pt delivered in the JESUS position.  Mouth and nares were bulb suctioned.  Posterior shoulder delivered first atraumatically, followed by the anterior shoulder.  Infant was placed to mother's chest.  Cord was doubly clamped and cut.  Cord blood was obtained.  Placenta was delivered spontaneously.  Uterus was firm with fundal massage.        Infant    Findings: female infant     Infant observations: Weight: No birth weight on file.  Length:   in  Observations/Comments:        Apgars:   @ 1 minute /      @ 5 minutes   Infant Name:      Placenta, Cord, and Fluid    Placenta delivered  Spontaneous at  2/5/2025  5:55 PM    Cord: 3 vessels present.   Nuchal Cord?  no   Cord blood obtained: Yes                  Repair    Episiotomy: None   Lacerations: Yes  Laceration Information  Laceration Repaired?   Perineal: 2nd Yes   Periurethral: right Yes   Labial:       Sulcus:       Vaginal:       Cervical:         Suture used for repair: 2-0 chromic gut     Estimated Blood Loss: 250  mls.   Suture used for repair:      Complications  Cholestasis.     Disposition  Mother to postpartum in stable condition.    Claire Elliott DO  02/05/25  18:10 EST

## 2025-02-06 ENCOUNTER — PATIENT OUTREACH (OUTPATIENT)
Dept: LABOR AND DELIVERY | Facility: HOSPITAL | Age: 20
End: 2025-02-06
Payer: COMMERCIAL

## 2025-02-06 VITALS
HEIGHT: 64 IN | OXYGEN SATURATION: 98 % | HEART RATE: 103 BPM | TEMPERATURE: 97.8 F | BODY MASS INDEX: 24.24 KG/M2 | DIASTOLIC BLOOD PRESSURE: 71 MMHG | RESPIRATION RATE: 17 BRPM | SYSTOLIC BLOOD PRESSURE: 103 MMHG | WEIGHT: 142 LBS

## 2025-02-06 LAB
HCT VFR BLD AUTO: 28.5 % (ref 34–46.6)
HGB BLD-MCNC: 9.1 G/DL (ref 12–15.9)

## 2025-02-06 PROCEDURE — 85018 HEMOGLOBIN: CPT | Performed by: OBSTETRICS & GYNECOLOGY

## 2025-02-06 PROCEDURE — 85014 HEMATOCRIT: CPT | Performed by: OBSTETRICS & GYNECOLOGY

## 2025-02-06 RX ORDER — CETIRIZINE HYDROCHLORIDE 10 MG/1
10 TABLET ORAL DAILY PRN
COMMUNITY

## 2025-02-06 RX ORDER — PRENATAL VIT NO.126/IRON/FOLIC 28MG-0.8MG
1 TABLET ORAL DAILY
COMMUNITY

## 2025-02-06 RX ORDER — URSODIOL 300 MG/1
300 CAPSULE ORAL 2 TIMES DAILY
COMMUNITY

## 2025-02-06 RX ORDER — FERROUS SULFATE 324(65)MG
324 TABLET, DELAYED RELEASE (ENTERIC COATED) ORAL
COMMUNITY

## 2025-02-06 RX ORDER — ALBUTEROL SULFATE 90 UG/1
2 INHALANT RESPIRATORY (INHALATION) EVERY 4 HOURS PRN
Status: ON HOLD | COMMUNITY
End: 2025-02-06

## 2025-02-06 RX ORDER — EPINEPHRINE 0.3 MG/.3ML
0.3 INJECTION SUBCUTANEOUS AS NEEDED
COMMUNITY

## 2025-02-06 RX ORDER — FERROUS SULFATE 325(65) MG
325 TABLET ORAL
Status: DISCONTINUED | OUTPATIENT
Start: 2025-02-06 | End: 2025-02-07 | Stop reason: HOSPADM

## 2025-02-06 RX ADMIN — IBUPROFEN 800 MG: 800 TABLET, FILM COATED ORAL at 20:29

## 2025-02-06 RX ADMIN — BENZOCAINE 1 APPLICATION: 5.6 OINTMENT TOPICAL at 05:28

## 2025-02-06 RX ADMIN — DOCUSATE SODIUM 100 MG: 100 CAPSULE, LIQUID FILLED ORAL at 20:29

## 2025-02-06 RX ADMIN — HYDROCODONE BITARTRATE AND ACETAMINOPHEN 1 TABLET: 5; 325 TABLET ORAL at 05:47

## 2025-02-06 RX ADMIN — FERROUS SULFATE TAB 325 MG (65 MG ELEMENTAL FE) 325 MG: 325 (65 FE) TAB at 11:36

## 2025-02-06 RX ADMIN — WITCH HAZEL: 500 SOLUTION RECTAL; TOPICAL at 05:28

## 2025-02-06 RX ADMIN — ACETAMINOPHEN 650 MG: 325 TABLET ORAL at 18:36

## 2025-02-06 RX ADMIN — IBUPROFEN 800 MG: 800 TABLET, FILM COATED ORAL at 14:34

## 2025-02-06 RX ADMIN — HYDROCORTISONE 2.5% 1 APPLICATION: 25 CREAM TOPICAL at 05:28

## 2025-02-06 RX ADMIN — Medication: at 05:28

## 2025-02-06 RX ADMIN — DOCUSATE SODIUM 100 MG: 100 CAPSULE, LIQUID FILLED ORAL at 08:39

## 2025-02-06 RX ADMIN — IBUPROFEN 800 MG: 800 TABLET, FILM COATED ORAL at 08:39

## 2025-02-06 NOTE — OUTREACH NOTE
[facility]  IP Postpartum    Questions/Answers      Flowsheet Row Responses   Best Method for Contacting Home   Support Person Present Yes   Does the patient have a car seat at the hospital Yes   Delivery Note Reviewed Reviewed   Were birth expectations met? Yes   Is there a need for additional support/resources? No   Lactation Note Reviewed Other   Note Reviewed Other Comment NA   Is additional support needed? No   Any questions or concerns? No   Is the patient going to use Meds to Beds? Yes   Any concerns related discharge meds/ability to  prescriptions? No   OB Discharge Navigator Reviewed  Reviewed   Confirm Postpartum OB appointment No  [Will be made prior to discharge home.]   Confirm initial well-child Pediatrician appointment date/time: No  [Will be made prior to discharge home.]   Additional post-discharge F/U appointments No   Does patient have transportation to appointments? Yes   Any other assistance needed to ensure she is able to attend appointments? No   Does patient have supplies needed at home for  care? Clothing, Crib, Diapers            Spoke with patient and her family at bedside today. Pt doing well following vaginal delivery of her infant daughter yesterday. No urgent needs or concerns noted.    Referral submitted to the following resources (verbal consent received to submit demographic information):         Rae Helton RN  Maternity Nurse Navigator    2025, 13:10 EST

## 2025-02-06 NOTE — PROGRESS NOTES
"[facility]  Vaginal Delivery Progress Note    Subjective   Subjective  Postpartum Day 1: Vaginal Delivery    The patient feels well.  Her pain is well controlled with nonsteroidal anti-inflammatory drugs.   She is ambulating well.  Patient describes her bleeding as thin lochia.        Objective     Objective:  Vital signs (most recent): Blood pressure 96/64, pulse 86, temperature 98.1 °F (36.7 °C), temperature source Oral, resp. rate 18, height 162.6 cm (64.02\"), weight 64.4 kg (142 lb), last menstrual period 05/15/2024, SpO2 96%, currently breastfeeding.     Vital Signs Range for the last 24 hours  Temperature: Temp:  [97.2 °F (36.2 °C)-99 °F (37.2 °C)] 98.1 °F (36.7 °C)   Temp Source: Temp src: Oral   BP: BP: ()/(53-97) 96/64   Pulse: Heart Rate:  [] 86   Respirations: Resp:  [14-18] 18   Weight:       Admit Height:  Height: 162.6 cm (64.02\")    Physical Exam:  General:  no acute distresss.  Abdomen: Fundus: appropriate, firm, non tender  Extremities: normal, atraumatic, no cyanosis, and trace edema.     [unfilled]       Lab Results   Component Value Date    ABO O 02/05/2025    RH Positive 02/05/2025        Lab Results   Component Value Date    HGB 9.1 (L) 02/06/2025    HCT 28.5 (L) 02/06/2025         Assessment & Plan   Principal Problem:    Cholestasis during pregnancy in third trimester  Active Problems:    Postpartum care following vaginal delivery      Sera Jalloh is Day 1  post-partum  Vaginal, Spontaneous  .    Mild anemia due to pregnancy- continue iron   Plan:  Continue current care.      Claire Elliott DO  2/6/2025  10:38 EST    "

## 2025-02-06 NOTE — PAYOR COMM NOTE
"Sera Brooks (19 y.o. Female)       Date of Birth   2005    Social Security Number       Address   [address] [place] [address]    Home Phone       MRN   [id number]       Restoration   None    Marital Status   Single                            Admission Date   25    Admission Type   Elective    Admitting Provider   Claire Elliott DO    Attending Provider   Claire Elliott DO    Department, Room/Bed   [facility], W238/1       Discharge Date       Discharge Disposition       Discharge Destination                                 Attending Provider: Claire Elliott DO    Allergies: Vancomycin    Isolation: None   Infection: None   Code Status: CPR    Ht: 162.6 cm (64.02\")   Wt: 64.4 kg (142 lb)    Admission Cmt: None   Principal Problem: Cholestasis during pregnancy in third trimester [O26.643]                   Active Insurance as of 2025       Primary Coverage       Payor Plan Insurance Group Employer/Plan Group    [employer] [place] [employer] [place]        Payor Plan Address Payor Plan Phone Number Payor Plan Fax Number Effective Dates    [address] [address] [phone]  10/4/2023 - None Entered    [facility] [id number]         Subscriber Name Subscriber Birth Date Member ID       SERA BROOKS 2005 [id number]                     Emergency Contacts        (Rel.) Home Phone Work Phone Mobile Phone    brendan Tadeo (Spouse) [phone] -- --    Mike Brooks (Father) -- -- [phone]                 History & Physical        Claire Elliott DO at 25 0854          [facility]  Obstetric History and Physical    Chief Complaint   Patient presents with    Scheduled Induction     CHOLESTASIS       Subjective    Patient is a 19 y.o. female  currently at 38w0d, who presents with IOL secondary to cholestasis.    Her prenatal care is complicated by cholestasis and h/o asthma.  Her previous obstetric/gynecological history is noted for is " "non-contributory.    The following portions of the patients history were reviewed and updated as appropriate: current medications, allergies, past medical history, past surgical history, past family history, past social history, and problem list .       Prenatal Information:   Maternal Prenatal Labs  Blood Type ABO Type   Date Value Ref Range Status   02/05/2025 O  Final      Rh Status RH type   Date Value Ref Range Status   02/05/2025 Positive  Final      Antibody Screen Antibody Screen   Date Value Ref Range Status   02/05/2025 Negative  Final      Rapid Urine Drug Screen Barbiturates Screen, Urine   Date Value Ref Range Status   02/05/2025 Negative Negative Final     Benzodiazepine Screen, Urine   Date Value Ref Range Status   02/05/2025 Negative Negative Final     Methadone Screen, Urine   Date Value Ref Range Status   02/05/2025 Negative Negative Final     Opiate Screen   Date Value Ref Range Status   02/05/2025 Negative Negative Final     THC, Screen, Urine   Date Value Ref Range Status   02/05/2025 Negative Negative Final     Cocaine Screen, Urine   Date Value Ref Range Status   02/05/2025 Negative Negative Final     Amphetamine Screen, Urine   Date Value Ref Range Status   02/05/2025 Negative Negative Final     Buprenorphine, Screen, Urine   Date Value Ref Range Status   02/05/2025 Negative Negative Final     Methamphetamine, Ur   Date Value Ref Range Status   02/05/2025 Negative Negative Final     Oxycodone Screen, Urine   Date Value Ref Range Status   02/05/2025 Negative Negative Final     Tricyclic Antidepressants Screen   Date Value Ref Range Status   02/05/2025 Negative Negative Final      Group B Strep Culture No results found for: \"GBSANTIGEN\"           External Prenatal Results       Pregnancy Outside Results - Transcribed From Office Records - See Scanned Records For Details       Test Value Date Time    ABO  O  02/05/25 0749    Rh  Positive  02/05/25 0749    Antibody Screen  Negative  02/05/25 0650 "      ^ Negative  07/17/24     Varicella IgG       Rubella ^ Immune  07/17/24     Hgb  9.7 g/dL 02/05/25 0650      ^ 10.4 g/dL 12/07/24 2205       10.5 g/dL 12/07/24 0524    Hct  30.6 % 02/05/25 0650      ^ 30.7 % 12/07/24 2205       32.4 % 12/07/24 0524    HgB A1c        1h GTT       3h GTT Fasting       3h GTT 1 hour       3h GTT 2 hour       3h GTT 3 hour        Gonorrhea (discrete) ^ Negative  01/22/25 1416    Chlamydia (discrete) ^ Negative  01/22/25 1416    RPR ^ Non Reactive  11/27/24 0933    Syphils cascade: TP-Ab (FTA)       TP-Ab       TP-Ab (EIA)       TPPA       HBsAg ^ Negative  07/17/24     Herpes Simplex Virus PCR       Herpes Simplex VIrus Culture       HIV ^ Non-Reactive  07/17/24     Hep C RNA Quant PCR       Hep C Antibody ^ NEG  07/17/24     AFP       NIPT       Cystic Fibrosis (Jimmy)       Cystic Fibroisis        Spinal Muscular atrophy       Fragile X       Group B Strep ^ Negative  01/22/25     GBS Susceptibility to Clindamycin       GBS Susceptibility to Erythromycin       Fetal Fibronectin       Genetic Testing, Maternal Blood                 Drug Screening       Test Value Date Time    Urine Drug Screen       Amphetamine Screen  Negative  02/05/25 0649    Barbiturate Screen  Negative  02/05/25 0649      ^ Negative  12/08/24 1051    Benzodiazepine Screen  Negative  02/05/25 0649      ^ Negative  12/08/24 1051    Methadone Screen  Negative  02/05/25 0649      ^ Negative  12/08/24 1051    Phencyclidine Screen  Negative  02/05/25 0649    Opiates Screen  Negative  02/05/25 0649      ^ Negative  12/08/24 1051    THC Screen  Negative  02/05/25 0649    Cocaine Screen ^ Negative  12/08/24 1051    Propoxyphene Screen       Buprenorphine Screen  Negative  02/05/25 0649    Methamphetamine Screen       Oxycodone Screen  Negative  02/05/25 0649      ^ Negative  12/08/24 1051    Tricyclic Antidepressants Screen  Negative  02/05/25 0649              Legend    ^: Historical                               Past OB History:     OB History    Para Term  AB Living   1 0 0 0 0 0   SAB IAB Ectopic Molar Multiple Live Births   0 0 0 0 0 0      # Outcome Date GA Lbr Brett/2nd Weight Sex Type Anes PTL Lv   1 Current                Past Medical History: Past Medical History:   Diagnosis Date    Allergic rhinitis     Asthma     Cholestasis during pregnancy in third trimester     Seizures 2024    PSUDO ? ANXIETY      Past Surgical History Past Surgical History:   Procedure Laterality Date    DEBRIDEMENT OF ISCHIAL ULCER/BUTTOCKS WOUND      ABSESS    EXPLORATORY LAPAROSCOPY        Family History: Family History   Problem Relation Age of Onset    Lupus Mother     Lupus Maternal Grandmother       Social History:  reports that she has never smoked. She has never used smokeless tobacco.   reports that she does not currently use alcohol.   reports that she does not currently use drugs.        Review of Systems                  Review of Systems   Pertinent items are noted in HPI, all other systems reviewed and negative      Objective    Vital Signs Range for the last 24 hours  Temperature: Temp:  [97.9 °F (36.6 °C)-98 °F (36.7 °C)] 97.9 °F (36.6 °C)   Temp Source: Temp src: Oral   BP: BP: (117-126)/(77-85) 126/85   Pulse: Heart Rate:  [75-82] 78   Respirations: Resp:  [16] 16   Weight: Weight:  [64.4 kg (142 lb)] 64.4 kg (142 lb)     Physical Examination: General appearance - alert, well appearing, and in no distress  Chest - clear to auscultation, no wheezes, rales or rhonchi, symmetric air entry  Heart - normal rate, regular rhythm, normal S1, S2, no murmurs, rubs, clicks or gallops  Abdomen - soft, nontender, nondistended, no masses or organomegaly  Extremities - peripheral pulses normal, no pedal edema, no clubbing or cyanosis    Presentation: cephalic   Cervix: Exam by:     Dilation: 1 cm   Effacement: Cervical Effacement: 60   Station: -2         Assessment & Plan      Cholestasis during pregnancy in third  trimester      Assessment & Plan    Assessment/Plan:  1.  Intrauterine pregnancy at 38w0d weeks gestation with reassuring fetal status.    2.  Cholestasis will check CMP  3. GBS negative.   4. IOL- cytotec last pm and pitocin this am. Pt has no contraindication to nitrous oxide if desired.         Claire Elliott DO  2/5/2025  08:54 EST    Electronically signed by Claire Elliott DO at 02/05/25 0858       Orders (all)        Start     Ordered    02/06/25 1130  ferrous sulfate tablet 325 mg  Daily With Breakfast         02/06/25 1039    02/06/25 0800  Sitz Bath  3 Times Daily        Comments: PRN    02/05/25 1952 02/06/25 0600  Hemoglobin & Hematocrit, Blood  Timed        Comments: Postpartum Day 1      02/05/25 1952 02/06/25 0000  bisacodyl (DULCOLAX) suppository 10 mg  Daily PRN         02/05/25 1952 02/05/25 2100  ibuprofen (ADVIL,MOTRIN) tablet 800 mg  3 times daily         02/05/25 1952 02/05/25 2100  docusate sodium (COLACE) capsule 100 mg  2 Times Daily         02/05/25 1952 02/05/25 2045  metoclopramide (REGLAN) tablet 10 mg  Once         02/05/25 1952 02/05/25 1953  Transfer Patient  Once         02/05/25 1952 02/05/25 1953  Code Status and Medical Interventions: CPR (Attempt to Resuscitate); Full  Continuous         02/05/25 1952 02/05/25 1953  Vital Signs Per Hospital Policy  Per Hospital Policy/Protocol         02/05/25 1952 02/05/25 1953  Notify Physician  Until Discontinued         02/05/25 1952 02/05/25 1953  Up Ad Lucila  Until Discontinued         02/05/25 1952 02/05/25 1953  Diet: Regular/House; Fluid Consistency: Thin (IDDSI 0)  Diet Effective Now         02/05/25 1952 02/05/25 1953  Fundal and Lochia Check  Per Hospital Policy/Protocol        Comments: Q 15 min x 4, Q 30 min x 2, then Q Shift    02/05/25 1952 02/05/25 1953  RN to Assess Rh Status & Place RhIG Evaluation Order if Indicated  Continuous         02/05/25 1952 02/05/25 1953  Bladder Assessment   "Per Order Details        Comments: Postpartum 1) Upon Admission to Unit & Every 4 Hours PRN Until Voiding. 2) Out of Bed to Void in 8 Hours.    02/05/25 1952 02/05/25 1953  Straight Cath  Per Order Details        Comments: Postpartum: If Distended & Unable to Void, May Repeat Once.    02/05/25 1952 02/05/25 1953  Indwelling Urinary Catheter  Per Order Details        Comments: Postpartum : After Straight Cathed x2 or if Greater Than 1000mL Residual, Insert Indwelling Urinary Catheter Until Further MD Order.    02/05/25 1952 02/05/25 1953  Breast pump to bed  Once         02/05/25 1952 02/05/25 1953  If indicated -- Please administer RH Immunoglobulin based on results of cord blood evaluation and fetal screen lab tests, pharmacy to dispense  Continuous        Comments: See process instructions for reference range details.    02/05/25 1952 02/05/25 1952  diphenhydrAMINE (BENADRYL) capsule 25 mg  2 Times Daily PRN,   Status:  Discontinued         02/05/25 1952 02/05/25 1952  sodium chloride 0.9 % flush 1-10 mL  As Needed         02/05/25 1952 02/05/25 1952  oxytocin (PITOCIN) 30 units in 0.9% sodium chloride 500 mL (premix)  Continuous PRN         02/05/25 1952 02/05/25 1952  acetaminophen (TYLENOL) tablet 650 mg  Every 6 Hours PRN         02/05/25 1952 02/05/25 1952  HYDROcodone-acetaminophen (NORCO) 5-325 MG per tablet 1 tablet  Every 4 Hours PRN        Placed in \"Or\" Linked Group    02/05/25 1952 02/05/25 1952  HYDROcodone-acetaminophen (NORCO)  MG per tablet 1 tablet  Every 4 Hours PRN        Placed in \"Or\" Linked Group    02/05/25 1952 02/05/25 1952  diphenhydrAMINE (BENADRYL) capsule 25 mg  Nightly PRN,   Status:  Discontinued         02/05/25 1952 02/05/25 1952  magnesium hydroxide (MILK OF MAGNESIA) suspension 10 mL  Daily PRN         02/05/25 1952 02/05/25 1952  lanolin topical 1 Application  Every 1 Hour PRN         02/05/25 1952 02/05/25 1952  benzocaine-menthol " "(DERMOPLAST) 20-0.5 % topical spray  As Needed         02/05/25 1952 02/05/25 1952  witch hazel-glycerin (TUCKS) pad  As Needed         02/05/25 1952 02/05/25 1952  Hydrocortisone Ace-Pramoxine 1-1 % rectal cream 1 Application  As Needed         02/05/25 1952 02/05/25 1952  Hydrocortisone (Perianal) (ANUSOL-HC) 2.5 % rectal cream 1 Application  As Needed         02/05/25 1952 02/05/25 1952  benzocaine (AMERICAINE) 20 % rectal ointment 1 Application  As Needed         02/05/25 1952 02/05/25 1952  ondansetron ODT (ZOFRAN-ODT) disintegrating tablet 4 mg  Every 8 Hours PRN         02/05/25 1952 02/05/25 1900  oxytocin (PITOCIN) 30 units in 0.9% sodium chloride 500 mL (premix)  Continuous        Placed in \"Followed by\" Linked Group    02/05/25 1758 02/05/25 1858  diphenhydrAMINE (BENADRYL) capsule 25 mg  Every 6 Hours PRN         02/05/25 1859 02/05/25 1858  diphenhydrAMINE (BENADRYL) injection 25 mg  Every 6 Hours PRN         02/05/25 1859 02/05/25 1814  VTE Prophylaxis Not Indicated: Low Risk; No Risk Factors (0)  Once         02/05/25 1813 02/05/25 1759  Notify Provider (Specified)  Until Discontinued,   Status:  Canceled         02/05/25 1758 02/05/25 1759  Vital Signs Per Hospital Policy  Per Hospital Policy/Protocol,   Status:  Canceled         02/05/25 1758 02/05/25 1759  Fundal & Lochia Check  Every Shift,   Status:  Canceled       02/05/25 1758 02/05/25 1759  Diet: Regular/House; Fluid Consistency: Thin (IDDSI 0)  Diet Effective Now,   Status:  Canceled         02/05/25 1758 02/05/25 1758  Fundal & Lochia Check  As Needed,   Status:  Canceled      Comments: Every 15 Minutes x4, Then Every 30 Minutes x2, Then Every Shift    02/05/25 1758 02/05/25 1758  oxytocin (PITOCIN) 30 units in 0.9% sodium chloride 500 mL (premix)  Once,   Status:  Discontinued        Placed in \"Followed by\" Linked Group    02/05/25 1758 02/05/25 1758  acetaminophen (TYLENOL) tablet 650 mg  " Every 4 Hours PRN,   Status:  Discontinued         02/05/25 1758    02/05/25 1758  ibuprofen (ADVIL,MOTRIN) tablet 800 mg  Every 6 Hours PRN,   Status:  Discontinued         02/05/25 1758    02/05/25 1723  diphenhydrAMINE (BENADRYL) injection 50 mg  Every 6 Hours PRN,   Status:  Discontinued         02/05/25 1724    02/05/25 1627  mineral oil  Every 1 Hour PRN,   Status:  Discontinued         02/05/25 1627    02/05/25 1625  methylergonovine (METHERGINE) injection 200 mcg  Once As Needed,   Status:  Discontinued         02/05/25 1625    02/05/25 1625  carboprost (HEMABATE) injection 250 mcg  As Needed,   Status:  Discontinued         02/05/25 1625 02/05/25 1625  miSOPROStol (CYTOTEC) tablet 600 mcg  As Needed,   Status:  Discontinued         02/05/25 1625    02/05/25 0900  sodium chloride 0.9 % flush 10 mL  Every 12 Hours Scheduled,   Status:  Discontinued         02/05/25 0608    02/05/25 0859  Comprehensive Metabolic Panel  STAT         02/05/25 0858    02/05/25 0800  Vital Signs q 4 while awake  Every 4 Hours,   Status:  Canceled      Comments: While the patient is awake.    02/05/25 0608    02/05/25 0730  ABO RH Specimen Verification  STAT         02/05/25 0729    02/05/25 0707  Fentanyl, Urine - Urine, Clean Catch  Once         02/05/25 0706    02/05/25 0700  lactated ringers infusion  Continuous,   Status:  Discontinued         02/05/25 0608    02/05/25 0700  oxytocin (PITOCIN) 30 units in 0.9% sodium chloride 500 mL (premix)  Titrated,   Status:  Discontinued         02/05/25 0608    02/05/25 0649  Urine Drug Screen - Urine, Clean Catch  STAT         02/05/25 0648    02/05/25 0608  Admit To Obstetrics Inpatient  Once         02/05/25 0608    02/05/25 0608  Code Status and Medical Interventions: CPR (Attempt to Resuscitate); Full Support  Continuous,   Status:  Canceled         02/05/25 0608    02/05/25 0608  Obtain Informed Consent  Once         02/05/25 0608    02/05/25 0608  Vital Signs Per Hospital  Policy  Per Hospital Policy/Protocol,   Status:  Canceled         2508    25  Mini-Prep Prior to Delivery  Once,   Status:  Canceled         2508    25  Continuous Fetal Monitoring With NST on Admission and Prior to Initiation of Oxytocin.  Per Order Details,   Status:  Canceled        Comments: Continuous Fetal Monitoring With NST on Admission & Prior to Initiation of Oxytocin.    25  External Uterine Contraction Monitoring  Per Hospital Policy/Protocol,   Status:  Canceled         2508    25  Notify Provider (Specified)  Until Discontinued,   Status:  Canceled         2508    25  Notify Provider of Tachysystole (Per Hospital Algorithm)  Until Discontinued,   Status:  Canceled         2508    25  Notify Provider if Membranes Ruptured, Bleeding Greater Than 1 Pad Per Hour, Fetal Heart Tone Abnormality or Severe Pain  Until Discontinued,   Status:  Canceled         2508    25  May Ambulate if Membranes Intact or Head Engaged With Ruptured BOW or Normal Tracing for 20 Minutes  Until Discontinued,   Status:  Canceled         2508    25  Initiate Group Beta Strep (GBS) Prophylaxis Protocol, If Criteria Met  Continuous,   Status:  Canceled        Comments: NO TREATMENT RECOMMENDED IF: 1) Maternal GBS Status Known Negative 2) Scheduled  Birth With Intact Membranes, Not in Labor 3) Maternal GBS Status Unknown, No Risk Factors  TREAT WITH ANTIBIOTICS IF:  1) Maternal GBS Status Known Positive 2) Maternal GBS Status Unknown With Risk Factors: a)  Previous Infant Affected By GBS Infection b) GBS Urinary Tract Infection (UTI) or Bacteriuria During Pregnancy c) Unexplained Maternal Fever (100.4F (38C) or Greater) During Labor d)  Prolonged Rupture of Membranes (18 or More Hours) e) Gestational Age Less Than 37 Weeks    25  NPO  "Diet NPO Type: Ice Chips  Diet Effective Now,   Status:  Canceled         02/05/25 0608    02/05/25 0608  Treponema pallidum AB w/Reflex RPR  Once         02/05/25 0608    02/05/25 0608  CBC & Differential  STAT         02/05/25 0608    02/05/25 0608  Type & Screen  STAT         02/05/25 0608    02/05/25 0608  Insert Peripheral IV  Once,   Status:  Canceled         02/05/25 0608    02/05/25 0608  Saline Lock & Maintain IV Access  Continuous,   Status:  Canceled         02/05/25 0608    02/05/25 0608  VTE Prophylaxis Not Indicated: Low Risk; No Risk Factors (0)  Once         02/05/25 0608    02/05/25 0608  CBC Auto Differential  PROCEDURE ONCE         02/05/25 0609    02/05/25 0607  Position Change - For Intra-Uterine Resusitation for Hypertonus, HyperStimulation or Non-Reassuring Fetal Status  As Needed,   Status:  Canceled       02/05/25 0608    02/05/25 0607  Urine Drug Screen - Urine, Clean Catch  As Needed,   Status:  Canceled       02/05/25 0608    02/05/25 0607  sodium chloride 0.9 % flush 10 mL  As Needed,   Status:  Discontinued         02/05/25 0608    02/05/25 0607  lactated ringers bolus 1,000 mL  Once As Needed         02/05/25 0608    02/05/25 0607  acetaminophen (TYLENOL) tablet 650 mg  Every 4 Hours PRN,   Status:  Discontinued         02/05/25 0608    02/05/25 0607  butorphanol (STADOL) injection 1 mg  Every 2 Hours PRN,   Status:  Discontinued         02/05/25 0608    02/05/25 0607  butorphanol (STADOL) injection 2 mg  Every 3 Hours PRN,   Status:  Discontinued         02/05/25 0608    02/05/25 0607  ondansetron ODT (ZOFRAN-ODT) disintegrating tablet 4 mg  Every 6 Hours PRN,   Status:  Discontinued        Placed in \"Or\" Linked Group    02/05/25 0608    02/05/25 0607  ondansetron (ZOFRAN) injection 4 mg  Every 6 Hours PRN,   Status:  Discontinued        Placed in \"Or\" Linked Group    02/05/25 0608    02/05/25 0000  Group B Streptococcus Culture - Swab, Vaginal/Rectum        Comments: This is an " external result entered through the Results Console.      02/05/25 0605 02/05/25 0000  Antibody Screen        Comments: This is an external result entered through the Results Console.      02/05/25 0605 02/05/25 0000  ABO / Rh        Comments: This is an external result entered through the Results Console.      02/05/25 0605 02/05/25 0000  Hepatitis C Antibody        Comments: This is an external result entered through the Results Console.      02/05/25 0605 02/05/25 0000  Hepatitis B Surface Antigen        Comments: This is an external result entered through the Results Console.      02/05/25 0605 02/05/25 0000  Rubella Antibody, IgG        Comments: This is an external result entered through the Results Console.      02/05/25 0605 02/05/25 0000  HIV-1 Antibody, EIA        Comments: This is an external result entered through the Results Console.      02/05/25 0605    Unscheduled  Apply Ice to Perineum  As Needed      Comments: For 20 min q 2 hrs    02/05/25 1952    Unscheduled  Kpad  As Needed      Comments: For pain    02/05/25 1952    Unscheduled  Warm compress  As Needed       02/05/25 1952    Unscheduled  Apply ace wrap, tight bra, or binder  As Needed       02/05/25 1952    Unscheduled  Apply ice packs  As Needed       02/05/25 1952    --  Prenatal Vit-Fe Fumarate-FA (prenatal vitamin 27-0.8) 27-0.8 MG tablet tablet  Daily,   Status:  Discontinued         02/05/25 0629    --  ursodiol (ACTIGALL) 300 MG capsule  2 Times Daily,   Status:  Discontinued         02/05/25 0629    --  diphenhydrAMINE (BENADRYL) 25 mg capsule  2 Times Daily PRN         02/05/25 0629    --  albuterol sulfate  (90 Base) MCG/ACT inhaler  Daily PRN,   Status:  Discontinued         02/05/25 0629    --  ferrous sulfate 325 (65 FE) MG tablet  Daily,   Status:  Discontinued         02/05/25 0748    --  cetirizine (zyrTEC) 10 MG tablet  Daily PRN,   Status:  Discontinued         02/05/25 0749    --  EPINEPHrine (EpiPen  2-Kash) 0.3 MG/0.3ML solution auto-injector injection  As Needed,   Status:  Discontinued         02/05/25 0749    --  ferrous sulfate 324 (65 Fe) MG tablet delayed-release EC tablet  Daily With Breakfast         02/06/25 0628    --  ursodiol (ACTIGALL) 300 MG capsule  2 Times Daily         02/06/25 0631    --  albuterol sulfate  (90 Base) MCG/ACT inhaler  Every 4 Hours PRN         02/06/25 0631    --  cetirizine (zyrTEC) 10 MG tablet  Daily PRN         02/06/25 0632    --  EPINEPHrine (EPIPEN) 0.3 MG/0.3ML solution auto-injector injection  As Needed         02/06/25 0634    --  prenatal vitamin (prenatal, CLASSIC, vitamin) tablet  Daily         02/06/25 0635                     Operative/Procedure Notes (all)        Claire Elliott DO at 02/05/25 1810  Version 1 of [address] [place] [address]  Vaginal Delivery Note    Pre-op Diagnosis:  Intrauterine pregnancy at 38w0d    Delivery     Delivery: Vaginal, Spontaneous    YOB: 2026   Time of Birth: 5:52 PM     Anesthesia: Epidural    Delivering clinician: Dr Elliott   Forceps?   No   Vacuum? No    Shoulder dystocia present: No        Delivery narrative:              Pt delivered in the JESUS position.  Mouth and nares were bulb suctioned.  Posterior shoulder delivered first atraumatically, followed by the anterior shoulder.  Infant was placed to mother's chest.  Cord was doubly clamped and cut.  Cord blood was obtained.  Placenta was delivered spontaneously.  Uterus was firm with fundal massage.        Infant    Findings: female infant     Infant observations: Weight: No birth weight on file.  Length:   in  Observations/Comments:        Apgars:   @ 1 minute /      @ 5 minutes   Infant Name:      Placenta, Cord, and Fluid    Placenta delivered  Spontaneous at  2/5/2025  5:55 PM    Cord: 3 vessels present.   Nuchal Cord?  no   Cord blood obtained: Yes                  Repair    Episiotomy: None   Lacerations: Yes  Laceration Information  Laceration Repaired?    Perineal: 2nd Yes   Periurethral: right Yes   Labial:       Sulcus:       Vaginal:       Cervical:         Suture used for repair: 2-0 chromic gut     Estimated Blood Loss: 250  mls.   Suture used for repair:      Complications  Cholestasis.     Disposition  Mother to postpartum in stable condition.    Claire Elliott DO  02/05/25  18:10 EST      Electronically signed by Claire Elliott DO at 02/05/25 8703

## 2025-02-06 NOTE — PLAN OF CARE
Goal Outcome Evaluation:Pt. Resting @ intervals this shift. FF. Small amt of rubra lochia. No distress observed.

## 2025-02-06 NOTE — PLAN OF CARE
Goal Outcome Evaluation:  Plan of Care Reviewed With: patient        Progress: improving  Outcome Evaluation: Patient ambulating independently in room and to NICU. Fundus firm and at midline. Lochia WNL. Pt voiding spontaneously without difficulty. No complaints or acute changes. VSS.

## 2025-02-06 NOTE — LACTATION NOTE
Pumping encouraged every three hours, or at baby's feeding times for optimal milk initiation/ production.  Educational NICU packet given to mom to help with pumping. All questions answered at this time. PRN Lactation Consultant/Clinic contact encouraged.

## 2025-02-07 NOTE — PLAN OF CARE
Problem: Adult Inpatient Plan of Care  Goal: Plan of Care Review  Outcome: Progressing  Flowsheets  Taken 2/7/2025 0018 by Krissy Sims, RN  Progress: improving  Outcome Evaluation: vitals and bleeding wnl, fundus firm, voiding without difficulty  Taken 2/6/2025 1628 by Ivana Briscoe, RN  Plan of Care Reviewed With: patient  Goal: Patient-Specific Goal (Individualized)  Outcome: Progressing  Goal: Absence of Hospital-Acquired Illness or Injury  Outcome: Progressing  Intervention: Identify and Manage Fall Risk  Description: Perform standard risk assessment on admission using a validated tool or comprehensive approach appropriate to the patient; reassess fall risk frequently, with change in status or transfer to another level of care.  Communicate risk to interprofessional healthcare team; ensure fall risk visible cue.  Determine need for increased observation, equipment and environmental modification, as well as use of supportive, nonskid footwear.  Adjust safety measures to individual needs and identified risk factors.  Reinforce the importance of active participation with fall risk prevention, safety, and physical activity with the patient and family.  Perform regular intentional rounding to assess need for position change, pain assessment and personal needs, including assistance with toileting.  Flowsheets (Taken 2/6/2025 2000)  Safety Promotion/Fall Prevention:   safety round/check completed   nonskid shoes/slippers when out of bed  Intervention: Prevent Skin Injury  Description: Perform a screening for skin injury risk, such as pressure or moisture-associated skin damage on admission and at regular intervals throughout hospital stay.  Keep all areas of skin (especially folds) clean and dry.  Maintain adequate skin hydration.  Relieve and redistribute pressure and protect bony prominences and skin at risk for injury; implement measures based on patient-specific risk factors.  Match turning and  repositioning schedule to clinical condition.  Encourage weight shift frequently; assist with reposition if unable to complete independently.  Float heels off bed; avoid pressure on the Achilles tendon.  Keep skin free from extended contact with medical devices.  Optimize nutrition and hydration.  Encourage functional activity and mobility, as early as tolerated.  Use aids (e.g., slide boards, mechanical lift) during transfer.  Flowsheets (Taken 2/6/2025 0839 by Ariana Claros, RN)  Body Position: position changed independently  Intervention: Prevent Infection  Description: Maintain skin and mucous membrane integrity; promote hand, oral and pulmonary hygiene.  Optimize fluid balance, nutrition, sleep and glycemic control to maximize infection resistance.  Identify potential sources of infection early to prevent or mitigate progression of infection (e.g., wound, lines, devices).  Evaluate ongoing need for invasive devices; remove promptly when no longer indicated.  Review vaccination status.  Flowsheets (Taken 2/6/2025 0839 by Ariana Claros, RN)  Infection Prevention: single patient room provided  Goal: Optimal Comfort and Wellbeing  Outcome: Progressing  Intervention: Provide Person-Centered Care  Description: Use a family-focused approach to care; encourage support system presence and participation.  Develop trust and rapport by proactively providing information, encouraging questions, addressing concerns and offering reassurance.  Acknowledge emotional response to hospitalization.  Recognize and utilize personal coping strategies and strengths; develop goals via shared decision-making.  Honor spiritual and cultural preferences.  Flowsheets (Taken 2/6/2025 2000)  Trust Relationship/Rapport:   care explained   choices provided  Goal: Readiness for Transition of Care  Outcome: Progressing  Intervention: Mutually Develop Transition Plan  Description: Identify available resources for support (e.g., family,  friends, community).  Identify and address barriers to ongoing treatment and home management (e.g., environmental, financial).  Provide opportunities to practice self-management skills.  Assess and monitor emotional readiness for transition.  Establish or reconnect linkage with outpatient providers or community-based services.  Flowsheets  Taken 2025 0018 by Krissy Sims, RN  Equipment Currently Used at Home: none  Concerns to be Addressed: no discharge needs identified  Readmission Within the Last 30 Days: no previous admission in last 30 days  Patient/Family Anticipates Transition to: home  Taken 2025 0640 by Allyssa Adan, RN  Transportation Anticipated: family or friend will provide     Problem: Postpartum (Vaginal Delivery)  Goal: Successful Parent Role Transition  Outcome: Progressing  Intervention: Support Parent Role Transition  Description: Develop trust, relationship and rapport.  Use a family-focused approach; promote involvement of support system in infant care.  Incorporate cultural beliefs, rituals and practices in family-centered care.  Encourage and support breastfeeding, frequent holding and skin-to-skin contact with .  Encourage attachment behaviors; promote involvement in infant care.  Monitor patient emotional state, as well as patient-infant interaction.  Encourage and answer questions; share resources for support following discharge.  Flowsheets (Taken 2025 214 by Emy Ybarra, RN)  Supportive Measures:   active listening utilized   counseling provided  Goal: Hemostasis  Outcome: Progressing  Goal: Absence of Infection Signs and Symptoms  Outcome: Progressing  Goal: Anesthesia/Sedation Recovery  Outcome: Progressing  Intervention: Optimize Anesthesia Recovery  Description: Assess and monitor airway, breathing and circulation; maintain close surveillance for deterioration.  Elevate head of bed, if able; facilitate regular position changes.  Assess and monitor  neurovascular and neuromuscular function, such as motor strength, tone, posture, peripheral pulses and extremity sensation; protect areas of decreased sensation from heat, cold, medical devices or objects.  Individualize frequency and intensity of monitoring based on sedation or anesthesia administered, identified risk factors, ongoing assessment and organizational protocol.  Prepare for administration of pharmacologic therapy, such as reversal agent, antiemetic or antipruritic medication, to manage sedation or anesthesia effects.  Adjust environment to maintain safety (e.g., fall precautions, safety equipment).  Flowsheets (Taken 2/6/2025 2000)  Safety Promotion/Fall Prevention:   safety round/check completed   nonskid shoes/slippers when out of bed  Goal: Optimal Pain Control and Function  Outcome: Progressing  Intervention: Prevent or Manage Pain  Description: Set pain management goals; mutually determine pain management plan and review plan regularly.  Use a consistent, validated tool for pain assessment, including function and quality of life; evaluate pain level, effect of treatment and patient's response at regular intervals.  Match pharmacologic analgesia to severity and type of pain mechanism; evaluate risk for opioid use and dependence; consider multimodal approach and titrate to patient response.  Manage medication-induced effects, such as constipation, pruritus, nausea, urinary retention, somnolence and dizziness.  Initiate individualized nonpharmacologic pain management measures; consider addition of complementary or alternative therapy.  Consider and address emotional response to pain.  Monitor perineal condition; note presence of hematoma, hemorrhoids and episiotomy appearance (if applicable).  Use cold application, as culturally-appropriate, to the perineal area for the first 24 to 48 hours following delivery for comfort.  Verify correct infant latch when breastfeeding to prevent nipple pain.  If  engorgement occurs, encourage more frequent breastfeeding or pumping and storing additional milk to ease discomfort. Cold compresses, as culturally-appropriate, may be used if bottle-feeding.  If postdural puncture headache identified, encourage adequate hydration and anticipate the need for epidural blood patch.  If hemorrhoids are present and painful, offer topical pain relief and sitz baths for comfort.  Flowsheets (Taken 2/5/2025 2130 by Carlee Pritchard RN)  Perineal Care:   perineal hygiene encouraged   perineum cleansed  Goal: Effective Urinary Elimination  Outcome: Progressing  Intervention: Monitor and Manage Urinary Retention  Description: Monitor fluid balance to promote optimal hydration.  Assess for bladder distension; encourage voiding within 6 hours following delivery and regularly thereafter.  Promote behavioral methods to enhance voiding ability, that may include relaxation techniques, mutually established regular elimination schedule, adequate time for bladder emptying, as well as positioning to optimize flow.  Implement methods to facilitate elimination (e.g., running water, warm water over perineum, sitz bath, privacy).  Utilize portable bladder ultrasound to assess pre- and postvoid volumes.  Consider potential contributing factors (e.g., perineal trauma, lack of privacy, medication, immobility, constipation).  Anticipate the need for intermittent catheterization if other methods are unsuccessful and bladder ultrasound reveals large volume.  Facilitate urogenital hygiene to maintain perineal skin integrity.  Promote early mobilization to improve return of urinary tract function.  Flowsheets (Taken 2/6/2025 2000)  Urinary Elimination Promotion: frequent voiding encouraged   Goal Outcome Evaluation:           Progress: improving  Outcome Evaluation: vitals and bleeding wnl, fundus firm, voiding without difficulty

## 2025-02-12 NOTE — DISCHARGE SUMMARY
" Mayur  Delivery Discharge Summary    Primary OB Clinician:     EDC: Estimated Date of Delivery: 25    Gestational Age:38w0d    Antepartum complications: IHC    Date of Delivery: 2025  Time of Delivery: 5:52 PM    Delivered By:  Claire Elliott    Delivery Type: Vaginal, Spontaneous     Tubal Ligation: n/a    Baby:female infant;   Apgar:  7  @ 1 minute /   Apgar:  8  @ 5 minutes   Weight: 3020 g (6 lb 10.5 oz)     Anesthesia: Epidural     Intrapartum complications: None    [unfilled]       Lab Results   Component Value Date    ABO O 2025    RH Positive 2025        Lab Results   Component Value Date    HGB 9.1 (L) 2025    HCT 28.5 (L) 2025       Subjective   Subjective  Postpartum Day 1:  Delivery    The patient feels well.  Her pain is well controlled with nonsteroidal anti-inflammatory drugs.   She is ambulating well.  Patient describes her bleeding as thin lochia.        Objective     Objective:  Vital signs (most recent): Blood pressure 103/71, pulse 103, temperature 97.8 °F (36.6 °C), temperature source Oral, resp. rate 17, height 162.6 cm (64.02\"), weight 64.4 kg (142 lb), last menstrual period 05/15/2024, SpO2 98%, currently breastfeeding.     Vital Signs Range for the last 24 hours  Temperature:     Temp Source:     BP:     Pulse:     Respirations:     Weight:       Admit Height:  Height: 162.6 cm (64.02\")    Physical Exam:  General:  no acute distresss.  Abdomen: Fundus: appropriate, firm, non tender  Extremities: normal, atraumatic, no cyanosis, and trace edema.         Assesment and Plan:    PPD 1 s/p    transferred to higher level of care and maternal request to be discharged.   Follow-up appointment with [facility] in 3 weeks.    Discharge Date: 2025; Discharge Time: 13:37 EST        Claire Elliott DO  2025  13:35 EST    "

## 2025-02-17 ENCOUNTER — PATIENT OUTREACH (OUTPATIENT)
Dept: LABOR AND DELIVERY | Facility: HOSPITAL | Age: 20
End: 2025-02-17
Payer: COMMERCIAL

## 2025-02-17 NOTE — OUTREACH NOTE
Motherhood Connection  Postpartum Check-In    Questions/Answers      Flowsheet Row Responses   Visit Setting Telephone   Best Method for Contacting Home   OB Discharge Note Reviewed  Reviewed   OB Discharge Navigator Reviewed  Reviewed   OB Discharge Medications Reviewed  Reviewed    discharged home with mother? Infant in ICU   Comment Infant remains at  on 2025.   Current Pain Levels 0-10 0   At Rest Pain Levels 0-10 0   Pain level with activity 0-10 0   Acceptable Pain Level 0-10 2   Pain Location --  [Denies]   Verbalized Emotional State Acceptance   Family/Support Network Family, Significant Other   Level of Involvement in Care Attentive, Supportive   Do you feel comfortable in your relationship with your baby? Yes   Have members of your household adjusted to your baby? Other   Comment Infant remains in NICU   Is the baby's father supportive and/or involved with the baby? Yes   How does your partner feel about the baby? Happy, Involved   Do you feel safe at home, school and work? Yes   Are you in a relationship with someone who threatens you or hurts you? No   Do you have the resources to keep yourself and your baby healthy and safe? Yes   Lochia (per patient report) Similar to Menstrual Flow   Amount Scant   Number of pads per day 3   Lochia Odor Similar to menstrual flow   Is patient breastfeeding? Yes, pumping   Breast Care Breast Pads, Supportive Bra   pumping - Left Breast Full, Soft   Pumping - Right Breast Full, Soft   Pumping - Left Nipple Intact   Pumping - Right Nipple Intact   Frequency Every 3 hours   Pumping Quantity 320 - 350 ml   Storage of Milk refrigerator/freezer in NICU   Postpartum Depression Screening Education Education Provided   Peripheral Blood Glucose Other   Doctor Appointments: Education Provided   Breastfeeding Education Education Provided   Family Planning Education Declined   Postpartum Care Education Education Provided   S & S to report Education Provided   Followup  Appointments Made Yes   Well Child Visit Appointments Made N/A  [Infant remains in NICU at ]   Appointment Date 25   Provider/Agency [facility], [facility]   Peripheral Blood Glucose Education Referred NA            Review of Systems   Genitourinary:  Positive for vaginal bleeding.   All other systems reviewed and are negative.    Most Recent [place]  Depression Scale Score (EPDS)    Performed by a clinician: 2 (2025  4:52 PM)    Received via Loom questionnaire:  ()     Spoke with patient over the phone.   Pt had vaginal delivery of her infant daughter on 2025. Her infant was transferred to [facility] NICU. Pt reports she has minimal pain and PP bleeding. She states she continues to pump breasts to provided expressed breastmilk ofr infants feedings. EPDS completed. 5Ps declined. Pt denies any needs.  5 Ps Screen  Declined    Referral submitted to the following resources (verbal consent received to submit demographic information):         Rae Helton RN  Maternity Nurse Navigator    2025, 17:03 EST

## 2025-02-24 ENCOUNTER — PATIENT OUTREACH (OUTPATIENT)
Dept: CALL CENTER | Facility: HOSPITAL | Age: 20
End: 2025-02-24
Payer: COMMERCIAL

## 2025-02-24 NOTE — OUTREACH NOTE
Motherhood Connection Survey      Flowsheet Row Responses   [facility] facility patient discharged from? [place]   Week 1 attempt successful? Yes   Call start time 1608   Call end time 1614   Baby sex Girl    discharged home with mother? Yes  [Baby is home from  now!]   Baby sex Girl   Delivery type Vaginal   Emotional state Acceptance   Family support Yes   Do you have all necessary resources to care for you and your baby?  Yes   Have members of your household adjusted to your baby? Yes   Did you have any problems with pre-eclampsia during this pregnancy? No   Do you have any of the following: No Issues   Did you have blood glucose issues during this pregnancy No   Are you currently experiencing Headaches  [Occasional]   Lochia amount Light   Lochia per patient report Serosa   Additional comments Vaginal delivery   Feeding Method Combination   Frequency Every 3 hours   Duration Mom puts to breast and then gives 80 ml pumped Breastmilk every other feeding   Storage of Milk refrigerator/freezer   Breast Condition No   Nipple Condition No   Signs baby is ready to eat Crying   Feeding tolerance Weight gain, Adequate pause for breath, Wet/dirty diapers   Number of wet diapers x 24 hours 10   Last BM x 24 hours Numerous BM's a day   Umbilical Cord No reported signs or symptoms   Where does the baby usually sleep? Bassinet, Crib   Are there stuffed animals, toys, pillows, quilts, blankets, wedges, positioners, bumpers or other loose bedding in the infant's sleeping environment? No   Does the baby ever share a sleep surface in a bed, couch, recliner or other? No   What position do you lay your baby down to sleep? Back   Are you and/or other caregivers smoking inside or outside the baby's home? Yes   Mom appointment comments: Mom goes for followup this Thurs.   Baby appointment comments: Baby was discharged from  on Friday and will followup with Peds   Call completed? Yes              Lui CHIANG - Registered  Nurse

## 2025-08-05 ENCOUNTER — TRANSCRIBE ORDERS (OUTPATIENT)
Dept: ADMINISTRATIVE | Facility: HOSPITAL | Age: 20
End: 2025-08-05
Payer: COMMERCIAL

## 2025-08-05 DIAGNOSIS — R10.2 PELVIC PAIN SYNDROME: Primary | ICD-10-CM

## 2025-08-07 ENCOUNTER — HOSPITAL ENCOUNTER (OUTPATIENT)
Dept: ULTRASOUND IMAGING | Facility: HOSPITAL | Age: 20
Discharge: HOME | End: 2025-08-07
Admitting: NURSE PRACTITIONER
Payer: COMMERCIAL

## 2025-08-07 DIAGNOSIS — R10.2 PELVIC PAIN SYNDROME: ICD-10-CM

## 2025-08-07 PROCEDURE — 76830 TRANSVAGINAL US NON-OB: CPT
